# Patient Record
Sex: FEMALE | NOT HISPANIC OR LATINO | Employment: FULL TIME | URBAN - METROPOLITAN AREA
[De-identification: names, ages, dates, MRNs, and addresses within clinical notes are randomized per-mention and may not be internally consistent; named-entity substitution may affect disease eponyms.]

---

## 2017-01-19 ENCOUNTER — RESULT FOLLOW UP (OUTPATIENT)
Dept: FAMILY MEDICINE | Facility: CLINIC | Age: 42
End: 2017-01-19

## 2017-01-19 ENCOUNTER — OFFICE VISIT (OUTPATIENT)
Dept: FAMILY MEDICINE | Facility: CLINIC | Age: 42
End: 2017-01-19
Payer: COMMERCIAL

## 2017-01-19 VITALS
OXYGEN SATURATION: 99 % | HEART RATE: 67 BPM | BODY MASS INDEX: 30.73 KG/M2 | TEMPERATURE: 97.5 F | WEIGHT: 180 LBS | HEIGHT: 64 IN | DIASTOLIC BLOOD PRESSURE: 75 MMHG | SYSTOLIC BLOOD PRESSURE: 111 MMHG

## 2017-01-19 DIAGNOSIS — N76.0 BV (BACTERIAL VAGINOSIS): ICD-10-CM

## 2017-01-19 DIAGNOSIS — B96.89 BV (BACTERIAL VAGINOSIS): ICD-10-CM

## 2017-01-19 DIAGNOSIS — Z12.4 CERVICAL CANCER SCREENING: ICD-10-CM

## 2017-01-19 DIAGNOSIS — Z11.3 SCREEN FOR STD (SEXUALLY TRANSMITTED DISEASE): ICD-10-CM

## 2017-01-19 DIAGNOSIS — N89.8 VAGINAL DISCHARGE: ICD-10-CM

## 2017-01-19 DIAGNOSIS — R30.9 PAIN PASSING URINE: Primary | ICD-10-CM

## 2017-01-19 DIAGNOSIS — R87.810 CERVICAL HIGH RISK HPV (HUMAN PAPILLOMAVIRUS) TEST POSITIVE: Primary | ICD-10-CM

## 2017-01-19 LAB
ALBUMIN UR-MCNC: NEGATIVE MG/DL
APPEARANCE UR: CLEAR
BILIRUB UR QL STRIP: NEGATIVE
COLOR UR AUTO: YELLOW
GLUCOSE UR STRIP-MCNC: NEGATIVE MG/DL
HGB UR QL STRIP: NEGATIVE
KETONES UR STRIP-MCNC: NEGATIVE MG/DL
LEUKOCYTE ESTERASE UR QL STRIP: NEGATIVE
MICRO REPORT STATUS: ABNORMAL
NITRATE UR QL: NEGATIVE
PH UR STRIP: 6.5 PH (ref 5–7)
SP GR UR STRIP: 1.01 (ref 1–1.03)
SPECIMEN SOURCE: ABNORMAL
URN SPEC COLLECT METH UR: NORMAL
UROBILINOGEN UR STRIP-ACNC: 0.2 EU/DL (ref 0.2–1)
WET PREP SPEC: ABNORMAL

## 2017-01-19 PROCEDURE — 87389 HIV-1 AG W/HIV-1&-2 AB AG IA: CPT | Performed by: PHYSICIAN ASSISTANT

## 2017-01-19 PROCEDURE — 87624 HPV HI-RISK TYP POOLED RSLT: CPT | Performed by: PHYSICIAN ASSISTANT

## 2017-01-19 PROCEDURE — 86780 TREPONEMA PALLIDUM: CPT | Performed by: PHYSICIAN ASSISTANT

## 2017-01-19 PROCEDURE — 87210 SMEAR WET MOUNT SALINE/INK: CPT | Performed by: PHYSICIAN ASSISTANT

## 2017-01-19 PROCEDURE — 87591 N.GONORRHOEAE DNA AMP PROB: CPT | Performed by: PHYSICIAN ASSISTANT

## 2017-01-19 PROCEDURE — 36415 COLL VENOUS BLD VENIPUNCTURE: CPT | Performed by: PHYSICIAN ASSISTANT

## 2017-01-19 PROCEDURE — 99213 OFFICE O/P EST LOW 20 MIN: CPT | Performed by: PHYSICIAN ASSISTANT

## 2017-01-19 PROCEDURE — 86803 HEPATITIS C AB TEST: CPT | Performed by: PHYSICIAN ASSISTANT

## 2017-01-19 PROCEDURE — 81003 URINALYSIS AUTO W/O SCOPE: CPT | Performed by: PHYSICIAN ASSISTANT

## 2017-01-19 PROCEDURE — G0145 SCR C/V CYTO,THINLAYER,RESCR: HCPCS | Performed by: PHYSICIAN ASSISTANT

## 2017-01-19 PROCEDURE — 87491 CHLMYD TRACH DNA AMP PROBE: CPT | Performed by: PHYSICIAN ASSISTANT

## 2017-01-19 RX ORDER — METRONIDAZOLE 7.5 MG/G
1 GEL VAGINAL AT BEDTIME
Qty: 70 G | Refills: 0 | Status: SHIPPED | OUTPATIENT
Start: 2017-01-19 | End: 2017-01-24

## 2017-01-19 NOTE — LETTER
April 28, 2017      Mariola Pardo  [address] [facility] [address]    Dear ,      At [place], your health and wellness is our primary concern. That is why we are following up on a positive high risk HPV test from 1/19/17. Your provider had recommended that you have a Colposcopy completed by 4/19/17. Our records do not show that this has been done.      It is important to complete the follow up that your provider has suggested for you to ensure that there are no worsening changes which may, over time, develop into cancer.      If you have chosen not to do the recommended colposcopy, please contact our office at [phone] to schedule an appointment for a repeat PAP smear and HPV test at your earliest convenience.    If you have completed the tests outside of [facility] [place], please have the results forwarded to our office. We will update the chart for your primary Physician to review before your next annual physical.     Thank you for choosing [facility] [place]!    Sincerely,      Marita Jackson PA-C/maame

## 2017-01-19 NOTE — LETTER
[address] [id number] [phone]      February 13, 2017      Mariola Pardo  [address] [facility] [address]              Dear Mariola,    We have been unable to reach you by telephone regarding scheduling the Colposcopy.  Please contact our office to schedule this appointment at [phone].       Sincerely,      Marita Jackson PA-C

## 2017-01-19 NOTE — Clinical Note
[facility]  [address] [place]  [address]  [phone]        January 24, 2017    Oli Freitas  [address] [facility] [address]        Dear Oli,    Your STD testing is all NEGATIVE.      Results for orders placed or performed in visit on 01/19/17   UA reflex to Microscopic and Culture   Result Value Ref Range    Color Urine Yellow     Appearance Urine Clear     Glucose Urine Negative NEG mg/dL    Bilirubin Urine Negative NEG    Ketones Urine Negative NEG mg/dL    Specific Gravity Urine 1.015 1.003 - 1.035    Blood Urine Negative NEG    pH Urine 6.5 5.0 - 7.0 pH    Protein Albumin Urine Negative NEG mg/dL    Urobilinogen Urine 0.2 0.2 - 1.0 EU/dL    Nitrite Urine Negative NEG    Leukocyte Esterase Urine Negative NEG    Source Midstream Urine    Hepatitis C antibody   Result Value Ref Range    Hepatitis C Antibody  NR     Nonreactive   Assay performance characteristics have not been established for newborns,   infants, and children     HIV Antigen Antibody Combo   Result Value Ref Range    HIV Antigen Antibody Combo  NR     Nonreactive   HIV-1 p24 Ag & HIV-1/HIV-2 Ab Not Detected     Anti Treponema   Result Value Ref Range    Treponema pallidum Antibody Negative NEG   Pap imaged thin layer screen with HPV - recommended age 30 - 65 years (select HPV order below)   Result Value Ref Range    PAP NIL     Copath Report         Patient Name: OLI FREITAS  MR#: [id number]  Specimen #: [id number]  Collected: 1/19/2017  Received: 1/20/2017  Reported: 1/23/2017 14:45  Ordering Phy(s): KAVON CHUA    For improved result formatting, select 'View Enhanced Report Format'  under Linked Documents section.    SPECIMEN/STAIN PROCESS:  Pap imaged thin layer prep screening (Surepath, FocalPoint with guided  screening)       Pap-Cyto x 1, HPV ordered x 1    SOURCE: Cervical, endocervical  ----------------------------------------------------------------   Pap imaged thin layer  prep screening (Surepath, FocalPoint with guided  screening)  SPECIMEN ADEQUACY:  Satisfactory for evaluation.  -Transformation zone component absent.    CYTOLOGIC INTERPRETATION:    Negative for Intraepithelial Lesion or Malignancy         Organism(s):  -Shift in milka suggestive of bacterial vaginosis.    Electronically signed out by:  JESUS Carranza (ASCP)    Processed and screened at [facility]    CLINICAL HISTORY:  LMP: 1/6/2017    Papanicolaou Test Limitations:  Cervical cytology is a screening test  with limited sensitivity; regular screening is critical for cancer  prevention; Pap tests are primarily effective for the  diagnosis/prevention of squamous cell carcinoma, not adenocarcinomas or  other cancers.    TESTING LAB LOCATION:  [address]  [phone]    COLLECTION SITE:  Client:  [facility]  Location: [facility] (B)     Chlamydia trachomatis PCR   Result Value Ref Range    Specimen Description Cervix     Chlamydia Trachomatis PCR  NEG     Negative   Negative for C. trachomatis rRNA by transcription mediated amplification.   A negative result by transcription mediated amplification does not preclude the   presence of C. trachomatis infection because results are dependent on proper   and adequate collection, absence of inhibitors, and sufficient rRNA to be   detected.     Neisseria gonorrhoeae PCR   Result Value Ref Range    Specimen Descrip Cervix     N Gonorrhea PCR  NEG     Negative   Negative for N. gonorrhoeae rRNA by transcription mediated amplification.   A negative result by transcription mediated amplification does not preclude the   presence of N. gonorrhoeae infection because results are dependent on proper   and adequate collection, absence of inhibitors, and sufficient rRNA to be   detected.     Wet prep   Result Value Ref Range     Specimen Description Vagina     Wet Prep (A)      No Trichomonas seen  Clue cells seen  No yeast seen      Micro Report Status FINAL 01/19/2017        If you have any questions please call the clinic at [phone].    Sincerely,    Marita DOOLEY

## 2017-01-19 NOTE — PROGRESS NOTES
SUBJECTIVE:                                                    Mariola Pardo is a 41 year old female who presents to clinic today for the following health issues:      URINARY TRACT SYMPTOMS     Onset: 2 days      Description:   Painful urination (Dysuria): YES--burns after peeing  Blood in urine (Hematuria): no   Delay in urine (Hesitency): no    Intensity: moderate    Progression of Symptoms:  worsening    Accompanying Signs & Symptoms:  Fever/chills: no   Flank pain no  Nausea and vomiting: YES--nausea  Any vaginal symptoms: yellowish vaginal discharge and vaginal odor  Abdominal/Pelvic Pain: YES   History:   History of frequent UTI's: no   History of kidney stones: no   Sexually Active: no   Possibility of pregnancy: No    Precipitating factors:   none         Therapies Tried and outcome: nothing tried     2 days ago started burning after peeing. Urine is dark yellow. Feels she is drinking enough water.     Unprotected [place] in November with a man with multiple partners. Would like STD testing. No new partners since November. LMP 1/6/17.    Pain in belly button that started 2 day ago. Hurts if presses belly button. Radiates down to left thigh.     She also has cramping in left ovary that began with her period on 1/6/17 and has remained. It comes and goes. Feels like a tightening and aches. Period this month was darker than normal but not heavier than normal. Some new bloating this month. No constipation.    Had an abnormal pap smear at last visit at the [facility]. They took a sample of cells and it was reassuring.     Vaginal discharge is yellow, and cottage cheese like with a foul order like formaldehyde.    Had a cold with productive cough since the 8th. Runny nose, congestion. Yellowish phelgm in morning. Has some nausea. No headaches, diarrhea or vomiting. Still smoking.    Family history of hysterectomies. One from an infection. Grandma's sister had cervical cancer.  "    Problem list and histories reviewed & adjusted, as indicated.  Additional history: as documented    Problem list, Medication list, Allergies, and Medical/Social/Surgical histories reviewed in EPIC and updated as appropriate.    ROS:  Constitutional, HEENT, cardiovascular, pulmonary, gi and gu systems are negative, except as otherwise noted.    OBJECTIVE:                                                    /75 mmHg  Pulse 67  Temp(Src) 97.5  F (36.4  C) (Oral)  Ht 5' 4\" (1.626 m)  Wt 180 lb (81.647 kg)  BMI 30.88 kg/m2  SpO2 99%  LMP 01/06/2017  Body mass index is 30.88 kg/(m^2).  GENERAL: healthy, alert and no distress  ABDOMEN: Tenderness around umbilicus. No guarding or rebound tenderness. No palpable masses.   (female): normal female external genitalia, normal urethral meatus, vaginal mucosa, normal cervix/adnexa/uterus without masses. Malodorous vaginal discharge present.  BACK: Tender along sacral region. no CVA tenderness.     Diagnostic Test Results:  Results for orders placed or performed in visit on 01/19/17   UA reflex to Microscopic and Culture   Result Value Ref Range    Color Urine Yellow     Appearance Urine Clear     Glucose Urine Negative NEG mg/dL    Bilirubin Urine Negative NEG    Ketones Urine Negative NEG mg/dL    Specific Gravity Urine 1.015 1.003 - 1.035    Blood Urine Negative NEG    pH Urine 6.5 5.0 - 7.0 pH    Protein Albumin Urine Negative NEG mg/dL    Urobilinogen Urine 0.2 0.2 - 1.0 EU/dL    Nitrite Urine Negative NEG    Leukocyte Esterase Urine Negative NEG    Source Midstream Urine    Wet prep   Result Value Ref Range    Specimen Description Vagina     Wet Prep (A)      No Trichomonas seen  Clue cells seen  No yeast seen      Micro Report Status FINAL 01/19/2017           ASSESSMENT/PLAN:                                                    1. Pain passing urine  Most likely due to bacterial vaginosis.  - UA reflex to Microscopic and Culture: see above    2. Screen for " STD (sexually transmitted disease)  Per patient's request, as she had unprotected sex in November with someone who had multiple partners.   - Chlamydia trachomatis PCR  - Neisseria gonorrhoeae PCR  - Hepatitis C antibody  - HIV Antigen Antibody Combo  - Anti Treponema  - Wet prep for Trichomonas    3. Cervical cancer screening  Patient has past history of abnormal cervical cancer screen and was due for her next screen.  - HPV High Risk Types DNA Cervical  - Pap imaged thin layer screen with HPV - recommended age 30 - 65 years (select HPV order below)    4. Vaginal discharge  Wet prep showed bacterial vaginosis.    5. BV (bacterial vaginosis)  Confirmed with wet prep. Discussed necessity of abstaining from alcohol with oral metronidazole. Patient prefers topical over oral for this reason.  - metroNIDAZOLE (METROGEL) 0.75 % vaginal gel; Place 1 applicator vaginally At Bedtime for 5 days  Dispense: 70 g; Refill: 0    Return to clinic when healthy for a physical and to work up the low back pain and left sided pelvic pain.    JOELLEN Castro-student  Marita Jackson PA-C  [facility]  The student Lata Decker PAS2 acted as a scribe and the encounter documented above was completely performed by myself and the documentation reflects the work I have performed today.   Marita Jackson PA-C

## 2017-01-19 NOTE — MR AVS SNAPSHOT
"              After Visit Summary   1/19/2017    Mariola Pardo    MRN: [id number]           Patient Information     Date Of Birth          1975        Visit Information        Provider Department      1/19/2017 2:20 PM Marita Jackson PA-C [facility]        Today's Diagnoses     Pain passing urine    -  1     Screen for STD (sexually transmitted disease)         Cervical cancer screening         Vaginal discharge         BV (bacterial vaginosis)           Care Instructions    Return to clinic for a physical.         Follow-ups after your visit        Who to contact     If you have questions or need follow up information about today's clinic visit or your schedule please contact [facility] directly at [phone].  Normal or non-critical lab and imaging results will be communicated to you by MyChart, letter or phone within 4 business days after the clinic has received the results. If you do not hear from us within 7 days, please contact the clinic through MyChart or phone. If you have a critical or abnormal lab result, we will notify you by phone as soon as possible.  Submit refill requests through [employer] or call your pharmacy and they will forward the refill request to us. Please allow 3 business days for your refill to be completed.          Additional Information About Your Visit        MyChart Information     [employer] lets you send messages to your doctor, view your test results, renew your prescriptions, schedule appointments and more. To sign up, go to [email or web address] . Click on \"Log in\" on the left side of the screen, which will take you to the Welcome page. Then click on \"Sign up Now\" on the right side of the page.     You will be asked to enter the access code listed below, as well as some personal information. Please follow the directions to create your username and password.     Your access code is: [email or web address]  Expires: 4/19/2017  2:53 PM   " "  Your access code will  in 90 days. If you need help or a new code, please call your [facility] clinic or [phone].        Care EveryWhere ID     This is your Care EveryWhere ID. This could be used by other organizations to access your [facility] medical records  [id number]        Your Vitals Were     Pulse Temperature Height BMI (Body Mass Index) Pulse Oximetry Last Period    67 97.5  F (36.4  C) (Oral) 5' 4\" (1.626 m) 30.88 kg/m2 99% 2017       Blood Pressure from Last 3 Encounters:   17 111/75    Weight from Last 3 Encounters:   17 180 lb (81.647 kg)              We Performed the Following     Anti Treponema     Chlamydia trachomatis PCR     Hepatitis C antibody     HIV Antigen Antibody Combo     HPV High Risk Types DNA Cervical     Neisseria gonorrhoeae PCR     Pap imaged thin layer screen with HPV - recommended age 30 - 65 years (select HPV order below)     UA reflex to Microscopic and Culture     Wet prep          Today's Medication Changes          These changes are accurate as of: 17  2:53 PM.  If you have any questions, ask your nurse or doctor.               Start taking these medicines.        Dose/Directions    metroNIDAZOLE 0.75 % vaginal gel   Commonly known as:  METROGEL   Used for:  BV (bacterial vaginosis)   Started by:  Marita Jackson PA-C        Dose:  1 applicator   Place 1 applicator vaginally At Bedtime for 5 days   Quantity:  70 g   Refills:  0            Where to get your medicines      These medications were sent to [facility] [place] - [place], [place] - [address]  [address], [place]. \[facility]"" [id number]     Phone:  [phone]    - metroNIDAZOLE 0.75 % vaginal gel             Primary Care Provider    None Specified       No primary provider on file.        Thank you!     Thank you for choosing [facility]  for your care. Our goal is always to provide you with excellent care. Hearing back from our " patients is one way we can continue to improve our services. Please take a few minutes to complete the written survey that you may receive in the mail after your visit with us. Thank you!             Your Updated Medication List - Protect others around you: Learn how to safely use, store and throw away your medicines at www.disposemymeds.org.          This list is accurate as of: 1/19/17  2:53 PM.  Always use your most recent med list.                   Brand Name Dispense Instructions for use    metroNIDAZOLE 0.75 % vaginal gel    METROGEL    70 g    Place 1 applicator vaginally At Bedtime for 5 days       PRENATAL AD PO

## 2017-01-19 NOTE — LETTER
March 30, 2017      Mariola Pardo  [address] [facility] [address]    Dear ,    At [place], your health and wellness is our primary concern. That is why we are following up on an abnormal pap from 1/19/17, which was reported as positive for HPV. Your provider had recommended that you have a Colposcopy completed by 4/19/17. Our records do not show that this has not been scheduled.       It is important to complete the follow up that your provider has suggested for you to ensure that there are no worsening changes which may, over time, develop into cancer.      Please contact our office at  [phone] to schedule an appointment for a Colposcopy at your earliest convenience. If you have questions or concerns, please call the clinic and we will be happy to assist you.    If you have completed the tests outside of [place], please have the results forwarded to our office. We will update the chart for your primary Physician to review before your next annual physical.     Thank you for choosing [place]!    Sincerely,      Marita Jackson PA-C/tracy

## 2017-01-19 NOTE — NURSING NOTE
"Chief Complaint   Patient presents with     UTI       Initial /75 mmHg  Pulse 67  Temp(Src) 97.5  F (36.4  C) (Oral)  Ht 5' 4\" (1.626 m)  Wt 180 lb (81.647 kg)  BMI 30.88 kg/m2  SpO2 99%  LMP 01/06/2017 Estimated body mass index is 30.88 kg/(m^2) as calculated from the following:    Height as of this encounter: 5' 4\" (1.626 m).    Weight as of this encounter: 180 lb (81.647 kg).  BP completed using cuff size: cynthia Song CMA      "

## 2017-01-20 LAB
C TRACH DNA SPEC QL NAA+PROBE: NORMAL
HCV AB SERPL QL IA: NORMAL
HIV 1+2 AB+HIV1 P24 AG SERPL QL IA: NORMAL
N GONORRHOEA DNA SPEC QL NAA+PROBE: NORMAL
SPECIMEN SOURCE: NORMAL
SPECIMEN SOURCE: NORMAL
T PALLIDUM IGG+IGM SER QL: NEGATIVE

## 2017-01-23 LAB
COPATH REPORT: NORMAL
PAP: NORMAL

## 2017-01-25 PROBLEM — R87.810 CERVICAL HIGH RISK HPV (HUMAN PAPILLOMAVIRUS) TEST POSITIVE: Status: ACTIVE | Noted: 2017-01-19

## 2017-01-25 LAB
FINAL DIAGNOSIS: ABNORMAL
HPV HR 12 DNA CVX QL NAA+PROBE: POSITIVE
HPV16 DNA SPEC QL NAA+PROBE: NEGATIVE
HPV18 DNA SPEC QL NAA+PROBE: NEGATIVE
SPECIMEN DESCRIPTION: ABNORMAL

## 2017-01-25 NOTE — PROGRESS NOTES
7/2/14 LSIL pap/+ HR HPV (not 16 or 18).in Care Everywhere.  2016 Patient reported, abnormal pap at the [facility]. They took a sample of cells and it was reassuring.  1/19/17 NIL pap/+ HR HPV (not 16 or 18). Plan: colp bef 4/19/17 2/2/17 Pt. Advised.  2/6/17 LM  2/8/17 LM   2/13/17 Letter sent.   3/30/17 No colp scheduled, 2 mo reminder letter sent  4/19/17 [place] not done, updated to 6mo dx pap.  Letter Sent 4/28/17 (rlm)  12/06/17 LMTC and schedule at [facility]. ([facility])  12/20/17 Patient is lost to follow-up. Routed to provider for review. Provider acknowledged. ([facility])

## 2017-12-06 ENCOUNTER — TELEPHONE (OUTPATIENT)
Dept: FAMILY MEDICINE | Facility: CLINIC | Age: 42
End: 2017-12-06

## 2017-12-06 NOTE — TELEPHONE ENCOUNTER
Pt is past due for f/u pap smear if declining recommended colposcopy.  Reminder letter was sent 04/28/17.  LMTC and schedule at [facility].  Left this writer's number in case of questions ([phone]).  If no reply and/or appt within 2 weeks (12/20/17) pt will be considered lost to pap tracking f/u.  Susana So,    Pap Tracking

## 2019-04-09 ENCOUNTER — OFFICE VISIT (OUTPATIENT)
Dept: FAMILY MEDICINE | Facility: CLINIC | Age: 44
End: 2019-04-09
Payer: COMMERCIAL

## 2019-04-09 VITALS
DIASTOLIC BLOOD PRESSURE: 77 MMHG | HEART RATE: 66 BPM | TEMPERATURE: 98.2 F | WEIGHT: 165 LBS | OXYGEN SATURATION: 99 % | HEIGHT: 64 IN | BODY MASS INDEX: 28.17 KG/M2 | SYSTOLIC BLOOD PRESSURE: 124 MMHG

## 2019-04-09 DIAGNOSIS — Z83.3 FAMILY HISTORY OF DIABETES MELLITUS: ICD-10-CM

## 2019-04-09 DIAGNOSIS — N90.89 LABIAL LESION: ICD-10-CM

## 2019-04-09 DIAGNOSIS — R87.810 CERVICAL HIGH RISK HPV (HUMAN PAPILLOMAVIRUS) TEST POSITIVE: ICD-10-CM

## 2019-04-09 DIAGNOSIS — Z11.3 SCREEN FOR STD (SEXUALLY TRANSMITTED DISEASE): ICD-10-CM

## 2019-04-09 DIAGNOSIS — N76.0 BACTERIAL VAGINOSIS: ICD-10-CM

## 2019-04-09 DIAGNOSIS — N89.8 VAGINAL IRRITATION: ICD-10-CM

## 2019-04-09 DIAGNOSIS — Z72.51 HIGH RISK HETEROSEXUAL BEHAVIOR: ICD-10-CM

## 2019-04-09 DIAGNOSIS — B96.89 BACTERIAL VAGINOSIS: ICD-10-CM

## 2019-04-09 DIAGNOSIS — Z00.01 ENCOUNTER FOR PREVENTATIVE ADULT HEALTH CARE EXAM WITH ABNORMAL FINDINGS: Primary | ICD-10-CM

## 2019-04-09 LAB
ANION GAP SERPL CALCULATED.3IONS-SCNC: 9 MMOL/L (ref 3–14)
BUN SERPL-MCNC: 10 MG/DL (ref 7–30)
CALCIUM SERPL-MCNC: 8.5 MG/DL (ref 8.5–10.1)
CHLORIDE SERPL-SCNC: 105 MMOL/L (ref 94–109)
CO2 SERPL-SCNC: 23 MMOL/L (ref 20–32)
CREAT SERPL-MCNC: 0.51 MG/DL (ref 0.52–1.04)
GFR SERPL CREATININE-BSD FRML MDRD: >90 ML/MIN/{1.73_M2}
GLUCOSE SERPL-MCNC: 87 MG/DL (ref 70–99)
HBA1C MFR BLD: 5.5 % (ref 0–5.6)
POTASSIUM SERPL-SCNC: 3.8 MMOL/L (ref 3.4–5.3)
SODIUM SERPL-SCNC: 137 MMOL/L (ref 133–144)
SPECIMEN SOURCE: NORMAL
TSH SERPL DL<=0.005 MIU/L-ACNC: 1.55 MU/L (ref 0.4–4)
WET PREP SPEC: NORMAL

## 2019-04-09 PROCEDURE — 87210 SMEAR WET MOUNT SALINE/INK: CPT | Performed by: NURSE PRACTITIONER

## 2019-04-09 PROCEDURE — 87252 VIRUS INOCULATION TISSUE: CPT | Mod: 90 | Performed by: NURSE PRACTITIONER

## 2019-04-09 PROCEDURE — 99396 PREV VISIT EST AGE 40-64: CPT | Performed by: NURSE PRACTITIONER

## 2019-04-09 PROCEDURE — 83036 HEMOGLOBIN GLYCOSYLATED A1C: CPT | Performed by: NURSE PRACTITIONER

## 2019-04-09 PROCEDURE — 80048 BASIC METABOLIC PNL TOTAL CA: CPT | Performed by: NURSE PRACTITIONER

## 2019-04-09 PROCEDURE — 0064U ANTB TP TOTAL&RPR IA QUAL: CPT | Performed by: NURSE PRACTITIONER

## 2019-04-09 PROCEDURE — 36415 COLL VENOUS BLD VENIPUNCTURE: CPT | Performed by: NURSE PRACTITIONER

## 2019-04-09 PROCEDURE — 99000 SPECIMEN HANDLING OFFICE-LAB: CPT | Performed by: NURSE PRACTITIONER

## 2019-04-09 PROCEDURE — 86803 HEPATITIS C AB TEST: CPT | Performed by: NURSE PRACTITIONER

## 2019-04-09 PROCEDURE — 87591 N.GONORRHOEAE DNA AMP PROB: CPT | Performed by: NURSE PRACTITIONER

## 2019-04-09 PROCEDURE — 84443 ASSAY THYROID STIM HORMONE: CPT | Performed by: NURSE PRACTITIONER

## 2019-04-09 PROCEDURE — 99214 OFFICE O/P EST MOD 30 MIN: CPT | Mod: 25 | Performed by: NURSE PRACTITIONER

## 2019-04-09 PROCEDURE — 87389 HIV-1 AG W/HIV-1&-2 AB AG IA: CPT | Performed by: NURSE PRACTITIONER

## 2019-04-09 PROCEDURE — 87491 CHLMYD TRACH DNA AMP PROBE: CPT | Performed by: NURSE PRACTITIONER

## 2019-04-09 RX ORDER — METRONIDAZOLE 7.5 MG/G
1 GEL VAGINAL DAILY
Qty: 70 G | Refills: 0 | Status: SHIPPED | OUTPATIENT
Start: 2019-04-09 | End: 2019-04-14

## 2019-04-09 ASSESSMENT — ENCOUNTER SYMPTOMS
EYE PAIN: 0
NAUSEA: 0
NERVOUS/ANXIOUS: 0
HEMATURIA: 0
BREAST MASS: 0
SHORTNESS OF BREATH: 0
ABDOMINAL PAIN: 1
DIZZINESS: 1
SORE THROAT: 0
DYSURIA: 0
HEADACHES: 0
WEAKNESS: 0
FEVER: 0
JOINT SWELLING: 0
HEMATOCHEZIA: 0
HEARTBURN: 0
COUGH: 0
CONSTIPATION: 0
CHILLS: 0
DIARRHEA: 0
PARESTHESIAS: 0
PALPITATIONS: 0
FREQUENCY: 1
ARTHRALGIAS: 1
MYALGIAS: 1

## 2019-04-09 ASSESSMENT — PAIN SCALES - GENERAL: PAINLEVEL: NO PAIN (0)

## 2019-04-09 ASSESSMENT — MIFFLIN-ST. JEOR: SCORE: 1398.44

## 2019-04-09 NOTE — PATIENT INSTRUCTIONS
You are due for your Tdap vaccine but you believe you had this done within the past 10 years. Possibly at [facility]. Check your records and let us know.       Preventive Health Recommendations  Female Ages 40 to 49    Yearly exam:     See your health care provider every year in order to  1. Review health changes.   2. Discuss preventive care.    3. Review your medicines if your doctor prescribed any.      Get a Pap test every three years (unless you have an abnormal result and your provider advises testing more often).      If you get Pap tests with HPV test, you only need to test every 5 years, unless you have an abnormal result. You do not need a Pap test if your uterus was removed (hysterectomy) and you have not had cancer.      You should be tested each year for STDs (sexually transmitted diseases), if you're at risk.     Ask your doctor if you should have a mammogram.      Have a colonoscopy (test for colon cancer) if someone in your family has had colon cancer or polyps before age 50.       Have a cholesterol test every 5 years.       Have a diabetes test (fasting glucose) after age 45. If you are at risk for diabetes, you should have this test every 3 years.    Shots: Get a flu shot each year. Get a tetanus shot every 10 years.     Nutrition:     Eat at least 5 servings of fruits and vegetables each day.    Eat whole-grain bread, whole-wheat pasta and brown rice instead of white grains and rice.    Get adequate Calcium and Vitamin D.      Lifestyle    Exercise at least 150 minutes a week (an average of 30 minutes a day, 5 days a week). This will help you control your weight and prevent disease.    Limit alcohol to one drink per day.    No smoking.     Wear sunscreen to prevent skin cancer.    See your dentist every six months for an exam and cleaning.
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
## 2019-04-10 LAB
C TRACH DNA SPEC QL NAA+PROBE: NEGATIVE
HCV AB SERPL QL IA: NONREACTIVE
HIV 1+2 AB+HIV1 P24 AG SERPL QL IA: NONREACTIVE
N GONORRHOEA DNA SPEC QL NAA+PROBE: NEGATIVE
SPECIMEN SOURCE: NORMAL
SPECIMEN SOURCE: NORMAL
T PALLIDUM AB SER QL: NONREACTIVE

## 2019-04-15 NOTE — RESULT ENCOUNTER NOTE
Mariola Pardo,    Your lab results have been released to [facility].   Thyroid function and basic metabolic panel (looks at kidney function and electrolytes) are within normal ranges.   STD screening was negative.   Your viral culture is still in process though I think the genital lesion you had is unlikely to be herpes. I will let you know the results when I get them.   Please call the clinic if you have any concerns [phone].    CHARO Loo [facility]

## 2019-04-19 ENCOUNTER — HEALTH MAINTENANCE LETTER (OUTPATIENT)
Age: 44
End: 2019-04-19

## 2019-04-23 LAB
SPECIMEN SOURCE: NORMAL
VIRUS SPEC CULT: NORMAL
VIRUS SPEC CULT: NORMAL

## 2019-05-02 ENCOUNTER — RESULT FOLLOW UP (OUTPATIENT)
Dept: OBGYN | Facility: CLINIC | Age: 44
End: 2019-05-02

## 2019-05-02 ENCOUNTER — OFFICE VISIT (OUTPATIENT)
Dept: OBGYN | Facility: CLINIC | Age: 44
End: 2019-05-02
Payer: COMMERCIAL

## 2019-05-02 VITALS
SYSTOLIC BLOOD PRESSURE: 110 MMHG | WEIGHT: 167 LBS | HEART RATE: 70 BPM | DIASTOLIC BLOOD PRESSURE: 63 MMHG | BODY MASS INDEX: 28.67 KG/M2 | TEMPERATURE: 98.6 F

## 2019-05-02 DIAGNOSIS — R87.810 CERVICAL HIGH RISK HPV (HUMAN PAPILLOMAVIRUS) TEST POSITIVE: Primary | ICD-10-CM

## 2019-05-02 DIAGNOSIS — Z12.4 SCREENING FOR MALIGNANT NEOPLASM OF CERVIX: ICD-10-CM

## 2019-05-02 DIAGNOSIS — R87.810 ASCUS WITH POSITIVE HIGH RISK HPV CERVICAL: ICD-10-CM

## 2019-05-02 DIAGNOSIS — R87.610 ASCUS WITH POSITIVE HIGH RISK HPV CERVICAL: ICD-10-CM

## 2019-05-02 LAB — HCG UR QL: NEGATIVE

## 2019-05-02 PROCEDURE — 88175 CYTOPATH C/V AUTO FLUID REDO: CPT | Performed by: OBSTETRICS & GYNECOLOGY

## 2019-05-02 PROCEDURE — 99202 OFFICE O/P NEW SF 15 MIN: CPT | Performed by: OBSTETRICS & GYNECOLOGY

## 2019-05-02 PROCEDURE — G0124 SCREEN C/V THIN LAYER BY MD: HCPCS | Performed by: OBSTETRICS & GYNECOLOGY

## 2019-05-02 PROCEDURE — 87624 HPV HI-RISK TYP POOLED RSLT: CPT | Performed by: OBSTETRICS & GYNECOLOGY

## 2019-05-02 PROCEDURE — 81025 URINE PREGNANCY TEST: CPT | Performed by: OBSTETRICS & GYNECOLOGY

## 2019-05-02 NOTE — LETTER
May 29, 2019    Mariola Pardo  [address] [facility] [address]    Dear ,      We are contacting you in writing because we have been unable to reach you by phone.     We have recently received your PAP smear result. The result shows ASCUS or Atypical Squamous Cells of Undetermined Significance. This indicates a mild change only    We also tested your sample for the presence of the HPV (Human Papillomavirus). Your sample was positive for HPV. There are many types of HPV, but we test pap samples for the high risk types. HPV can be the cause of an abnormal Pap smear result.  The high risk types of HPV can also be related to the potential development of cervical cancer if not monitored and/or treated appropriately    Because of this, we need to do further testing. It is recommended that you schedule a colposcopy. Colposcopy is a way for your doctor to use a special magnifying device to look at your vulva, vagina, and cervix. If a problem is seen during colposcopy, a small sample of tissue may be collected for laboratory testing (biopsy). The exam and test will help determine the reason for your abnormal pap smear.    Please call [facility] [phone] to schedule this procedure, due before 08/2/19. Schedule this for a time when you are not due to have a period (if having regular menstrual bleeding). You can take an over the counter pain reliever (either 600mg of Ibuprofen or 2 tablets Acetaminophen by mouth) 1 hour before your colposcopy. Nothing in the vagina for 24 hours before your colposcopy (no sex, douches, vaginal medications or lubricants). No unprotected intercourse 2 weeks prior to this procedure. They will also check a urine pregnancy test prior to the Colposcopy.     If you have additional questions regarding this result, please call our registered nurse, Renu at [phone].    Sincerely,  Sandra Maravilla MD./  Renu Wooten RN-Pap Tracking

## 2019-05-02 NOTE — LETTER
October 18, 2019      Mariolaasael Mora Char  [address] [place] [address]    Dear MsDigna,      At [facility], your health and wellness is our primary concern. That is why we are following up on an abnormal pap from 05/02/19, which was reported as ASCUS and positive for high risk HPV. Your provider had recommended that you have a Colposcopy  completed by 08/02/19. Our records do not show that this has been done or scheduled.      It is important to complete the follow up that your provider has suggested for you to ensure that there are no worsening changes which may, over time, develop into cancer.      If you have chosen not to do the recommended colposcopy, please contact our office at [phone] to schedule an appointment for a repeat PAP smear and HPV test at your earliest convenience.    If you have completed the tests outside of [facility], please have the results forwarded to our office. We will update the chart for your primary Physician to review before your next annual physical.     Thank you for choosing [facility]!    Sincerely,      Your [facility] Care Team//[facility]

## 2019-05-02 NOTE — PROGRESS NOTES
GYN CLINIC VISIT  2019  CC: cervical cancer screening    HPI:  Mariola Pardo is a 43 year old  who presents for cervical cancer screening. She was lost to follow up and presents today with the following history:  14 LSIL pap/+ HR HPV (not 16 or 18).in Care Everywhere.   Patient reported, abnormal pap at the [facility]. They took a sample of cells and it was reassuring.  17 NIL pap/+ HR HPV (not 16 or 18). Plan: colp bef 17 Pt. Advised.  17 Patient is lost to follow-up.    No LMP recorded.  UPT today is negative  Patient does not smoke  Type of contraception: tubal ligation  Age at first sexual intercourse: 30  Number of sexual partners (lifetime): 16  Past GYN history: HPV  Prior cervical/vaginal disease: Normal exam without visible pathology.  Prior cervical treatment: no treatment.    Vitals:    19 1302   BP: 110/63   Pulse: 70   Temp: 98.6  F (37  C)   TempSrc: Oral   Weight: 75.8 kg (167 lb)     Gen: alert, oriented, no distress,  pleasant, appears stated age  HEENT: head normocephalic, atraumatic  CV: normal pulses, no edema  Resp: normal respiratory effort  Abd: soft, nontender, nondistended  : normal external genitalia without lesions or erythema; normal, well supported urethra, normal Bartholins, normal Skenes; normal pink rugated vaginal mucosa, no lesions or abnormal discharge; long rajwinder speculum inserted without difficulty; normal appearing cervix without lesions, bleeding or discharge.   Extr: warm, well perfused, nontender  Psych: affect bright, cooperative, responds appropriately      ASSESSMENT:  43 year old here for cervical cancer screening.    Discussed that I recommend pap and HPV test today. If both are normal, recommend cotest in 1 year. If either are abnormal, recommend colposcopy. Discussed this recommendation with patient who is in agreement. She would like to avoid colposcopy if not indicated but understands the  possibility that she may need to return for a colposcopy.    1. Cervical high risk HPV (human papillomavirus) test positive  - HCG Qual, Urine ([id number])    2. Screening for malignant neoplasm of cervix  - Pap imaged thin layer screen with HPV - recommended age 30 - 65 years (select HPV order below)  - HPV High Risk Types DNA Cervical      Sandra Maravilla MD

## 2019-05-02 NOTE — LETTER
[address]  [facility] [address]  [phone]      June 17, 2019    Mariola Pardo                                                                                                                     [address] [facility] [address]      Dear ,    We are contacting you in writing by certified letter because we have been unable to reach you by phone and by letter and want to ensure that you have recieved these results and recommendations.     We have recently received your PAP smear result. The result shows ASCUS or Atypical Squamous Cells of Undetermined Significance. This indicates a mild change only    We also tested your sample for the presence of the HPV (Human Papillomavirus). Your sample was positive for HPV. There are many types of HPV, but we test pap samples for the high risk types. HPV can be the cause of an abnormal Pap smear result.  The high risk types of HPV can also be related to the potential development of cervical cancer if not monitored and/or treated appropriately    Because of this, we need to do further testing. It is recommended that you schedule a colposcopy. Colposcopy is a way for your doctor to use a special magnifying device to look at your vulva, vagina, and cervix. If a problem is seen during colposcopy, a small sample of tissue may be collected for laboratory testing (biopsy). The exam and test will help determine the reason for your abnormal pap smear.    Please call [facility] [phone] to schedule this procedure, due before 08/2/19. Schedule this for a time when you are not due to have a period (if having regular menstrual bleeding). You can take an over the counter pain reliever (either 600mg of Ibuprofen or 2 tablets Acetaminophen by mouth) 1 hour before your colposcopy. Nothing in the vagina for 24 hours before your colposcopy (no sex, douches, vaginal medications or lubricants). No unprotected  intercourse 2 weeks prior to this procedure. They will also check a urine pregnancy test prior to the Colposcopy.     If you have additional questions regarding this result, please call our registered nurse, Renu at [phone].    Sincerely,    Sandra Maravilla MD./  Renu Wooten RN-Pap Tracking

## 2019-05-07 LAB
COPATH REPORT: ABNORMAL
PAP: ABNORMAL

## 2019-05-08 PROBLEM — R87.610 ASCUS WITH POSITIVE HIGH RISK HPV CERVICAL: Status: ACTIVE | Noted: 2017-01-19

## 2019-05-08 LAB
FINAL DIAGNOSIS: ABNORMAL
HPV HR 12 DNA CVX QL NAA+PROBE: POSITIVE
HPV16 DNA SPEC QL NAA+PROBE: NEGATIVE
HPV18 DNA SPEC QL NAA+PROBE: NEGATIVE
SPECIMEN DESCRIPTION: ABNORMAL
SPECIMEN SOURCE CVX/VAG CYTO: ABNORMAL

## 2019-05-08 NOTE — PROGRESS NOTES
7/2/14 LSIL pap/+ HR HPV (not 16 or 18).in Care Everywhere.  2016 Patient reported, abnormal pap at the [facility]. They took a sample of cells and it was reassuring.  1/19/17 NIL pap/+ HR HPV (not 16 or 18). Plan: colp bef 4/19/17 2/2/17 Pt. Advised.  12/20/17 Patient is lost to follow-up.  05/2/19: ASCUS Pap, + HR HPV (not 16 or 18) result. Plan [facility].   5/9/19 Message left to return call. ()  05/14/19: Msg left to call back.   05/21/19: Tc to mail saved result and recommendation letter. I also released this to the pt via [facility].   05/29/19 Result letter sent at request of RN. ([facility])  06/17/19: Result letter sent certified at request of RN: [phone] ([facility])  07/12/19 Per USPS tracking, certified letter returned to sender 07/11/19. Verification sent to [facility] for scanning into pts chart. ([facility])  08/05/19 3mo colp not done, chart and tracking updated for 6mo colp/pap. ([facility])  10/18/19 [facility]/pap reminder letter sent. ([facility])  11/15/19 [facility] clinic and schedule. ([facility])  12/12/19 Patient is lost to pap tracking follow-up. FYI routed to provider. ([facility])

## 2019-11-02 ENCOUNTER — HEALTH MAINTENANCE LETTER (OUTPATIENT)
Age: 44
End: 2019-11-02

## 2019-11-15 ENCOUNTER — TELEPHONE (OUTPATIENT)
Dept: OBGYN | Facility: CLINIC | Age: 44
End: 2019-11-15

## 2019-11-15 DIAGNOSIS — R87.810 ASCUS WITH POSITIVE HIGH RISK HPV CERVICAL: ICD-10-CM

## 2019-11-15 DIAGNOSIS — R87.610 ASCUS WITH POSITIVE HIGH RISK HPV CERVICAL: ICD-10-CM

## 2019-11-15 NOTE — TELEPHONE ENCOUNTER
Pt is past due for f/u pap smear if declining recommended colposcopy.  [facility] clinic and schedule.    Susana So  Pap Tracking

## 2020-02-10 ENCOUNTER — HEALTH MAINTENANCE LETTER (OUTPATIENT)
Age: 45
End: 2020-02-10

## 2020-11-14 ENCOUNTER — HEALTH MAINTENANCE LETTER (OUTPATIENT)
Age: 45
End: 2020-11-14

## 2021-09-10 ENCOUNTER — OFFICE VISIT (OUTPATIENT)
Dept: URGENT CARE | Facility: URGENT CARE | Age: 46
End: 2021-09-10
Payer: COMMERCIAL

## 2021-09-10 VITALS
BODY MASS INDEX: 32.61 KG/M2 | TEMPERATURE: 98.3 F | HEART RATE: 89 BPM | DIASTOLIC BLOOD PRESSURE: 81 MMHG | OXYGEN SATURATION: 99 % | WEIGHT: 190 LBS | SYSTOLIC BLOOD PRESSURE: 127 MMHG

## 2021-09-10 DIAGNOSIS — R30.0 DYSURIA: ICD-10-CM

## 2021-09-10 DIAGNOSIS — N30.00 ACUTE CYSTITIS WITHOUT HEMATURIA: Primary | ICD-10-CM

## 2021-09-10 LAB
ALBUMIN UR-MCNC: NEGATIVE MG/DL
APPEARANCE UR: CLEAR
BACTERIA #/AREA URNS HPF: ABNORMAL /HPF
BILIRUB UR QL STRIP: NEGATIVE
COLOR UR AUTO: YELLOW
GLUCOSE UR STRIP-MCNC: NEGATIVE MG/DL
HGB UR QL STRIP: NEGATIVE
KETONES UR STRIP-MCNC: NEGATIVE MG/DL
LEUKOCYTE ESTERASE UR QL STRIP: NEGATIVE
NITRATE UR QL: POSITIVE
PH UR STRIP: 6.5 [PH] (ref 5–7)
RBC #/AREA URNS AUTO: ABNORMAL /HPF
SP GR UR STRIP: 1.01 (ref 1–1.03)
SQUAMOUS #/AREA URNS AUTO: ABNORMAL /LPF
UROBILINOGEN UR STRIP-ACNC: 0.2 E.U./DL
WBC #/AREA URNS AUTO: ABNORMAL /HPF

## 2021-09-10 PROCEDURE — 99213 OFFICE O/P EST LOW 20 MIN: CPT | Performed by: FAMILY MEDICINE

## 2021-09-10 PROCEDURE — 81001 URINALYSIS AUTO W/SCOPE: CPT

## 2021-09-10 PROCEDURE — 87086 URINE CULTURE/COLONY COUNT: CPT | Performed by: FAMILY MEDICINE

## 2021-09-10 RX ORDER — NITROFURANTOIN 25; 75 MG/1; MG/1
100 CAPSULE ORAL 2 TIMES DAILY
Qty: 10 CAPSULE | Refills: 0 | Status: SHIPPED | OUTPATIENT
Start: 2021-09-10 | End: 2021-09-15

## 2021-09-11 NOTE — PROGRESS NOTES
Assessment & Plan   Acute cystitis without hematuria: dysuria, mild lower abdominal pain, frequency and positive nitrites. Very likely UTI. Will treat with macrobid, send for culture. Discussed concerning symptoms for which to return to urgent care or the ED. Push fluids, rest, OTC analgesics.  - UA macro with reflex to Microscopic and Culture - Clinc Collect  - Urine Microscopic Exam  - Urine Culture- nitroFURantoin macrocrystal-monohydrate (MACROBID) 100 MG capsule  Dispense: 10 capsule; Refill: 0     Aurea Rose MD  [facility] [place]    Joss Garnett is a 45 year old female who presents to clinic today for the following health issues:  Chief Complaint   Patient presents with     Urgent Care     UTI     c/o dysuria for 1 day     HPI    UTI    Onset of symptoms was 1day(s).  Course of illness is worsening  Severity moderate  Current and associated symptoms dysuria, frequency, urgency, blood in urine and suprapubic pain and pressure  Treatment and measures tried None  Predisposing factors include starting to use babywipes recently to wipe.  Patient denies rigors, flank pain, temperature > 101 degrees F., vomiting, vaginal discharge, vaginal odor, vaginal itching        Review of Systems  As above      Objective    /81   Pulse 89   Temp 98.3  F (36.8  C) (Tympanic)   Wt 86.2 kg (190 lb)   LMP 08/10/2021   SpO2 99%   BMI 32.61 kg/m    Physical Exam   Well appearing, no distress  Abdomen: soft, nontender, not distended, no guarding or rebound, normal bowel sounds.

## 2021-09-12 ENCOUNTER — HEALTH MAINTENANCE LETTER (OUTPATIENT)
Age: 46
End: 2021-09-12

## 2021-09-12 LAB — BACTERIA UR CULT: NO GROWTH

## 2021-09-12 NOTE — RESULT ENCOUNTER NOTE
Nothing grew out from the urine so you might not have a bladder infection, but if your symptoms are going away with the antibiotic, finish it out

## 2021-10-08 ENCOUNTER — OFFICE VISIT (OUTPATIENT)
Dept: URGENT CARE | Facility: URGENT CARE | Age: 46
End: 2021-10-08
Payer: COMMERCIAL

## 2021-10-08 VITALS
DIASTOLIC BLOOD PRESSURE: 87 MMHG | SYSTOLIC BLOOD PRESSURE: 138 MMHG | OXYGEN SATURATION: 97 % | TEMPERATURE: 98.2 F | WEIGHT: 190 LBS | HEART RATE: 80 BPM | BODY MASS INDEX: 32.61 KG/M2

## 2021-10-08 DIAGNOSIS — R07.0 THROAT PAIN: ICD-10-CM

## 2021-10-08 DIAGNOSIS — J02.0 STREP THROAT: Primary | ICD-10-CM

## 2021-10-08 LAB — DEPRECATED S PYO AG THROAT QL EIA: NEGATIVE

## 2021-10-08 PROCEDURE — 99213 OFFICE O/P EST LOW 20 MIN: CPT | Performed by: PHYSICIAN ASSISTANT

## 2021-10-08 PROCEDURE — U0005 INFEC AGEN DETEC AMPLI PROBE: HCPCS | Performed by: PHYSICIAN ASSISTANT

## 2021-10-08 PROCEDURE — U0003 INFECTIOUS AGENT DETECTION BY NUCLEIC ACID (DNA OR RNA); SEVERE ACUTE RESPIRATORY SYNDROME CORONAVIRUS 2 (SARS-COV-2) (CORONAVIRUS DISEASE [COVID-19]), AMPLIFIED PROBE TECHNIQUE, MAKING USE OF HIGH THROUGHPUT TECHNOLOGIES AS DESCRIBED BY CMS-2020-01-R: HCPCS | Performed by: PHYSICIAN ASSISTANT

## 2021-10-08 PROCEDURE — 87651 STREP A DNA AMP PROBE: CPT | Performed by: PHYSICIAN ASSISTANT

## 2021-10-08 RX ORDER — AMOXICILLIN 875 MG
875 TABLET ORAL 2 TIMES DAILY
Qty: 14 TABLET | Refills: 0 | Status: SHIPPED | OUTPATIENT
Start: 2021-10-08 | End: 2021-10-15

## 2021-10-09 LAB
GROUP A STREP BY PCR: NOT DETECTED
SARS-COV-2 RNA RESP QL NAA+PROBE: NEGATIVE

## 2021-10-09 NOTE — PATIENT INSTRUCTIONS
Patient Education     Pharyngitis: Strep (Presumed)    You have pharyngitis (sore throat). The healthcare staff may think your sore throat is caused by a bacteria called Group A streptococcus (strep). This is often called strep throat. This is diagnosed by either a rapid strep test, which gives immediate results, or a throat culture, or both. Strep throat can cause throat pain that is worse when swallowing, aching all over, headache, swollen lymph nodes or glands in the neck, and fever. The infection is contagious. It often spreads by coughing, kissing, or touching others after touching your mouth or nose. An antibiotic is given to treat the infection. Antibiotics are prescribed by doctors to treat bacterial infections, not viral infections.   Home care    Rest at home. Drink plenty of fluids so you won t get dehydrated.    Stay home from work or school for the first 24 hours of taking the antibiotics, or as directed by the healthcare provider. After this time, you won't be contagious. You can then return to work or school if you are feeling better, or as directed by the provider.     Take the antibiotic medicine for the full 10 days, or as directed by the provider, even when you feel better or your symptoms improve. This is very important to make sure the infection is fully treated. It's also important to prevent medicine-resistant germs from growing. It's also the best way to prevent rheumatic fever, which can affect your heart and other parts of the body. If you were given an antibiotic shot, your provider will tell you if more antibiotics are needed.    You may use acetaminophen or ibuprofen to control pain or fever, unless another medicine was prescribed for this. If you have chronic liver or kidney disease or ever had a stomach ulcer or gastrointestinal bleeding, talk with your provider before using these medicines.    Use throat lozenges or a throat-numbing spray to help reduce throat pain. Gargling with  warm salt water can also help reduce throat pain. Dissolve 1/2 teaspoon of salt in 1 glass of warm water.     Don t eat salty or spicy foods. These can irritate the throat.    Follow-up care  Follow up with your healthcare provider or our staff if you don't feel better in 72 hours, or as directed.   When to get medical advice  Call your healthcare provider right away if any of these occur:     Fever of 100.4 F (38 C), or higher, or as directed by your provider    New or worse ear pain, sinus pain, or headache    Painful lumps in the back of neck    Stiff neck    Lymph nodes that get larger or neck swelling    Can t open mouth wide due to throat pain    Signs of dehydration, such as very dark urine or no urine, sunken eyes, dizziness    Noisy breathing    Muffled voice    New rash    Symptoms are worse    New symptoms develop  Call 911  Call 911 if you have any of these symptoms:     Can't swallow, especially saliva, with a lot of drooling    Trouble breathing or wheezing    Feeling faint    Can't talk    Feeling of doom  Prevention  Here are steps you can take to help prevent an infection:    Keep good handwashing habits.    Don t have close contact with people who have sore throats, colds, or other upper respiratory infections.    Don t smoke, and stay away from secondhand smoke.    Stay up to date with all of your vaccines.  [employer] last reviewed this educational content on 2/1/2020 2000-2021 [employer]. All rights reserved. This information is not intended as a substitute for professional medical care. Always follow your healthcare professional's instructions.

## 2021-10-09 NOTE — PROGRESS NOTES
Throat pain  - Streptococcus A Rapid Screen w/Reflex to PCR - Clinic Collect  - Symptomatic COVID-19 Virus (Coronavirus) by PCR Nose  - amoxicillin (AMOXIL) 875 MG tablet; Take 1 tablet (875 mg) by mouth 2 times daily for 7 days  - Group A Streptococcus PCR Throat Swab    Strep throat  - amoxicillin (AMOXIL) 875 MG tablet; Take 1 tablet (875 mg) by mouth 2 times daily for 7 days    20 minutes spent on the date of the encounter doing chart review, history and exam, documentation and further activities per the note     See Patient Instructions  Patient Instructions     Patient Education     Pharyngitis: Strep (Presumed)    You have pharyngitis (sore throat). The healthcare staff may think your sore throat is caused by a bacteria called Group A streptococcus (strep). This is often called strep throat. This is diagnosed by either a rapid strep test, which gives immediate results, or a throat culture, or both. Strep throat can cause throat pain that is worse when swallowing, aching all over, headache, swollen lymph nodes or glands in the neck, and fever. The infection is contagious. It often spreads by coughing, kissing, or touching others after touching your mouth or nose. An antibiotic is given to treat the infection. Antibiotics are prescribed by doctors to treat bacterial infections, not viral infections.   Home care    Rest at home. Drink plenty of fluids so you won t get dehydrated.    Stay home from work or school for the first 24 hours of taking the antibiotics, or as directed by the healthcare provider. After this time, you won't be contagious. You can then return to work or school if you are feeling better, or as directed by the provider.     Take the antibiotic medicine for the full 10 days, or as directed by the provider, even when you feel better or your symptoms improve. This is very important to make sure the infection is fully treated. It's also important to prevent medicine-resistant germs from  growing. It's also the best way to prevent rheumatic fever, which can affect your heart and other parts of the body. If you were given an antibiotic shot, your provider will tell you if more antibiotics are needed.    You may use acetaminophen or ibuprofen to control pain or fever, unless another medicine was prescribed for this. If you have chronic liver or kidney disease or ever had a stomach ulcer or gastrointestinal bleeding, talk with your provider before using these medicines.    Use throat lozenges or a throat-numbing spray to help reduce throat pain. Gargling with warm salt water can also help reduce throat pain. Dissolve 1/2 teaspoon of salt in 1 glass of warm water.     Don t eat salty or spicy foods. These can irritate the throat.    Follow-up care  Follow up with your healthcare provider or our staff if you don't feel better in 72 hours, or as directed.   When to get medical advice  Call your healthcare provider right away if any of these occur:     Fever of 100.4 F (38 C), or higher, or as directed by your provider    New or worse ear pain, sinus pain, or headache    Painful lumps in the back of neck    Stiff neck    Lymph nodes that get larger or neck swelling    Can t open mouth wide due to throat pain    Signs of dehydration, such as very dark urine or no urine, sunken eyes, dizziness    Noisy breathing    Muffled voice    New rash    Symptoms are worse    New symptoms develop  Call 911  Call 911 if you have any of these symptoms:     Can't swallow, especially saliva, with a lot of drooling    Trouble breathing or wheezing    Feeling faint    Can't talk    Feeling of doom  Prevention  Here are steps you can take to help prevent an infection:    Keep good handwashing habits.    Don t have close contact with people who have sore throats, colds, or other upper respiratory infections.    Don t smoke, and stay away from secondhand smoke.    Stay up to date with all of your vaccines.  [employer] last  reviewed this educational content on 2/1/2020 2000-2021 [employer]. All rights reserved. This information is not intended as a substitute for professional medical care. Always follow your healthcare professional's instructions.               Kevni Deluca PA-C  [facility] U[facility]    Subjective   46 year old who presents to clinic today for the following health issues:    Urgent Care and Pharyngitis       HPI     Acute Illness  Acute illness concerns: sore throat and bilateral ear pain   Onset/Duration: 4 days  Symptoms:  Fever: no  Chills/Sweats: no  Headache (location?): YES  Sinus Pressure: no  Conjunctivitis:  no  Ear Pain: YES: bilateral  Rhinorrhea: no  Congestion: no  Sore Throat: YES (7/10)   Cough: YES-non-productive  Wheeze: YES  Decreased Appetite: no  Nausea: no  Vomiting: no  Diarrhea: no  Dysuria/Freq.: no  Dysuria or Hematuria: no  Fatigue/Achiness: no  Sick/Strep Exposure: None known  Therapies tried and outcome: Ibuprofen and tylenol     Review of Systems   Review of Systems   See HPI     Objective    Temp: 98.2  F (36.8  C) Temp src: Tympanic BP: 138/87 Pulse: 80     SpO2: 97 %       Physical Exam   Physical Exam  Constitutional:       General: She is not in acute distress.     Appearance: Normal appearance. She is normal weight. She is not ill-appearing, toxic-appearing or diaphoretic.   HENT:      Head: Normocephalic and atraumatic.      Right Ear: Tympanic membrane, ear canal and external ear normal. There is no impacted cerumen.      Left Ear: Tympanic membrane, ear canal and external ear normal. There is no impacted cerumen.      Nose: Congestion and rhinorrhea present.      Mouth/Throat:      Mouth: Mucous membranes are moist.      Pharynx: Oropharyngeal exudate and posterior oropharyngeal erythema present.   Eyes:      General: No scleral icterus.        Right eye: No discharge.         Left eye: No discharge.      Extraocular Movements: Extraocular movements  intact.      Conjunctiva/sclera: Conjunctivae normal.      Pupils: Pupils are equal, round, and reactive to light.   Cardiovascular:      Rate and Rhythm: Normal rate and regular rhythm.      Pulses: Normal pulses.      Heart sounds: Normal heart sounds. No murmur heard.   No friction rub. No gallop.    Pulmonary:      Effort: Pulmonary effort is normal. No respiratory distress.      Breath sounds: Normal breath sounds. No stridor. No wheezing, rhonchi or rales.   Chest:      Chest wall: No tenderness.   Musculoskeletal:      Cervical back: Normal range of motion and neck supple. No tenderness.   Lymphadenopathy:      Cervical: No cervical adenopathy.   Neurological:      General: No focal deficit present.      Mental Status: She is alert and oriented to person, place, and time. Mental status is at baseline.      Gait: Gait normal.   Psychiatric:         Mood and Affect: Mood normal.         Behavior: Behavior normal.         Thought Content: Thought content normal.         Judgment: Judgment normal.          Results for orders placed or performed in visit on 10/08/21 (from the past 24 hour(s))   Streptococcus A Rapid Screen w/Reflex to PCR - Clinic Collect    Specimen: Throat; Swab   Result Value Ref Range    Group A Strep antigen Negative Negative

## 2022-01-02 ENCOUNTER — HEALTH MAINTENANCE LETTER (OUTPATIENT)
Age: 47
End: 2022-01-02

## 2022-02-17 PROBLEM — F10.20 ALCOHOL DEPENDENCE, UNCOMPLICATED (H): Status: ACTIVE | Noted: 2019-04-09

## 2022-08-01 ENCOUNTER — TELEPHONE (OUTPATIENT)
Dept: FAMILY MEDICINE | Facility: CLINIC | Age: 47
End: 2022-08-01

## 2022-08-01 ENCOUNTER — NURSE TRIAGE (OUTPATIENT)
Dept: NURSING | Facility: CLINIC | Age: 47
End: 2022-08-01

## 2022-08-01 DIAGNOSIS — F41.9 ANXIETY: ICD-10-CM

## 2022-08-01 DIAGNOSIS — F40.9 FEAR FOR PERSONAL SAFETY: Primary | ICD-10-CM

## 2022-08-01 ASSESSMENT — PATIENT HEALTH QUESTIONNAIRE - PHQ9: SUM OF ALL RESPONSES TO PHQ QUESTIONS 1-9: 18

## 2022-08-01 NOTE — TELEPHONE ENCOUNTER
See triage notes for details.    PLAN:  Per protocol, advised to be seen within 3 days, video visit scheduled on 8/4. Patient verbalizes understanding.     Linda Ray RN/[place] Nurse Advisor
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
## 2022-08-01 NOTE — TELEPHONE ENCOUNTER
Contacted patient and she agreed to talk to [facility] on abnormal depression screen, suicidal thoughts    Direct transferred call to [facility]    Machelle Robb RN  [facility]      I got a message on epic that said that the patient had an abnormal depression screen. It appears that she has some suicidal thoughts and safety concerns. Please triage for more information.

## 2022-08-01 NOTE — TELEPHONE ENCOUNTER
I got a message on epic that said that the patient had an abnormal depression screen. It appears that she has some suicidal thoughts and safety concerns. Please triage for more information.    Future Appointments   Date Time Provider Department Center   8/4/2022  4:00 PM [facility]   9/6/2022  5:20 PM Maddison Lennon MD [facility]     No flowsheet data found.  PHQ 8/1/2022   PHQ-9 Total Score 18   Q9: Thoughts of better off dead/self-harm past 2 weeks More than half the days   F/U: Thoughts of suicide or self-harm No   F/U: Safety concerns Yes

## 2022-08-02 NOTE — TELEPHONE ENCOUNTER
Referral made for clinic care coordination to assist with concerns for her daughter's safety and her own anxiety. She has upcoming appointment to discuss anxiety with me as well.     Future Appointments   Date Time Provider Department Center   8/4/2022  4:00 PM Gisella Licea MD [facility]   9/6/2022  5:20 PM Maddison Lennon MD [facility]     Mariola was seen today for anxiety.    Diagnoses and all orders for this visit:    Fear for personal safety  -     Primary Care - Care Coordination Referral; Future    Anxiety  -     Primary Care - Care Coordination Referral; Future

## 2022-08-04 ENCOUNTER — VIRTUAL VISIT (OUTPATIENT)
Dept: FAMILY MEDICINE | Facility: CLINIC | Age: 47
End: 2022-08-04
Payer: COMMERCIAL

## 2022-08-04 DIAGNOSIS — F31.4 BIPOLAR DISORDER, CURRENT EPISODE DEPRESSED, SEVERE, WITHOUT PSYCHOTIC FEATURES (H): Primary | ICD-10-CM

## 2022-08-04 DIAGNOSIS — F10.20 ALCOHOL DEPENDENCE, UNCOMPLICATED (H): ICD-10-CM

## 2022-08-04 DIAGNOSIS — F51.5 NIGHTMARE: ICD-10-CM

## 2022-08-04 DIAGNOSIS — F43.89 REACTION TO CHRONIC STRESS: ICD-10-CM

## 2022-08-04 DIAGNOSIS — F17.200 SMOKING: ICD-10-CM

## 2022-08-04 DIAGNOSIS — F43.10 PTSD (POST-TRAUMATIC STRESS DISORDER): ICD-10-CM

## 2022-08-04 PROCEDURE — 99214 OFFICE O/P EST MOD 30 MIN: CPT | Mod: GT | Performed by: FAMILY MEDICINE

## 2022-08-04 RX ORDER — CLONIDINE HYDROCHLORIDE 0.1 MG/1
0.1 TABLET ORAL AT BEDTIME
Qty: 30 TABLET | Refills: 0 | Status: SHIPPED | OUTPATIENT
Start: 2022-08-04 | End: 2022-08-04

## 2022-08-04 RX ORDER — SERTRALINE HYDROCHLORIDE 25 MG/1
25 TABLET, FILM COATED ORAL DAILY
Qty: 45 TABLET | Refills: 0 | Status: CANCELLED | OUTPATIENT
Start: 2022-08-04

## 2022-08-04 RX ORDER — PRAZOSIN HYDROCHLORIDE 1 MG/1
1 CAPSULE ORAL AT BEDTIME
Qty: 30 CAPSULE | Refills: 0 | Status: SHIPPED | OUTPATIENT
Start: 2022-08-04 | End: 2022-08-18

## 2022-08-04 RX ORDER — QUETIAPINE FUMARATE 100 MG/1
100 TABLET, FILM COATED ORAL AT BEDTIME
Qty: 30 TABLET | Refills: 0 | Status: SHIPPED | OUTPATIENT
Start: 2022-08-04 | End: 2022-08-18

## 2022-08-04 ASSESSMENT — ANXIETY QUESTIONNAIRES
IF YOU CHECKED OFF ANY PROBLEMS ON THIS QUESTIONNAIRE, HOW DIFFICULT HAVE THESE PROBLEMS MADE IT FOR YOU TO DO YOUR WORK, TAKE CARE OF THINGS AT HOME, OR GET ALONG WITH OTHER PEOPLE: SOMEWHAT DIFFICULT
3. WORRYING TOO MUCH ABOUT DIFFERENT THINGS: NOT AT ALL
2. NOT BEING ABLE TO STOP OR CONTROL WORRYING: NOT AT ALL
7. FEELING AFRAID AS IF SOMETHING AWFUL MIGHT HAPPEN: NOT AT ALL
6. BECOMING EASILY ANNOYED OR IRRITABLE: NEARLY EVERY DAY
7. FEELING AFRAID AS IF SOMETHING AWFUL MIGHT HAPPEN: NOT AT ALL
1. FEELING NERVOUS, ANXIOUS, OR ON EDGE: SEVERAL DAYS
8. IF YOU CHECKED OFF ANY PROBLEMS, HOW DIFFICULT HAVE THESE MADE IT FOR YOU TO DO YOUR WORK, TAKE CARE OF THINGS AT HOME, OR GET ALONG WITH OTHER PEOPLE?: SOMEWHAT DIFFICULT
GAD7 TOTAL SCORE: 4
IF YOU CHECKED OFF ANY PROBLEMS ON THIS QUESTIONNAIRE, HOW DIFFICULT HAVE THESE PROBLEMS MADE IT FOR YOU TO DO YOUR WORK, TAKE CARE OF THINGS AT HOME, OR GET ALONG WITH OTHER PEOPLE: SOMEWHAT DIFFICULT
GAD7 TOTAL SCORE: 4
1. FEELING NERVOUS, ANXIOUS, OR ON EDGE: SEVERAL DAYS
5. BEING SO RESTLESS THAT IT IS HARD TO SIT STILL: NEARLY EVERY DAY
4. TROUBLE RELAXING: NOT AT ALL
6. BECOMING EASILY ANNOYED OR IRRITABLE: NOT AT ALL
GAD7 TOTAL SCORE: 4
2. NOT BEING ABLE TO STOP OR CONTROL WORRYING: NOT AT ALL
5. BEING SO RESTLESS THAT IT IS HARD TO SIT STILL: NOT AT ALL
3. WORRYING TOO MUCH ABOUT DIFFERENT THINGS: NOT AT ALL

## 2022-08-04 ASSESSMENT — PATIENT HEALTH QUESTIONNAIRE - PHQ9
SUM OF ALL RESPONSES TO PHQ QUESTIONS 1-9: 18
5. POOR APPETITE OR OVEREATING: NOT AT ALL
10. IF YOU CHECKED OFF ANY PROBLEMS, HOW DIFFICULT HAVE THESE PROBLEMS MADE IT FOR YOU TO DO YOUR WORK, TAKE CARE OF THINGS AT HOME, OR GET ALONG WITH OTHER PEOPLE: SOMEWHAT DIFFICULT

## 2022-08-04 NOTE — PATIENT INSTRUCTIONS
Recovering from depression takes time. If you are on medications keep taking your medication, let your doctor know if you are experiencing any side effects.  Use your  Depression Survival Kit . Follow-up with therapy and/or your doctor as recommended. If you feel you might ever harm yourself or another person, you should go to the emergency room or call 911 immediately    Coping with Depression
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
## 2022-08-04 NOTE — COMMUNITY RESOURCES LIST (ENGLISH)
08/04/2022   [facility] - Outpatient Clinics  N/A  For questions about this resource list or additional care needs, please contact your primary care clinic or care manager.  Phone: [phone]   Email: N/A   Address: [address] [place] [address]   Hours: N/A        Hotlines and Helplines       Hotline - Crisis help  1  [facility] Distance: 2.15 miles      COVID-19 Status: Phone/Virtual   [address] [place] [address]  Language: English, Hmong, Macedonian  Hours: Mon - Sun Open 24 Hours   Phone: (843) 658-9175 Website: https://www.[facility]./[facility]/health-medical/clinics-services/mental-behavorial-health/adult-mental-health-chemical-health/urgent-care-adult-mental-health     2  [place] Department of Human Services - Crisis Text Line - Crisis Text Line Distance: 2.41 miles      COVID-19 Status: Phone/Virtual   [address] [place] [address]  Language: English  Hours: Mon - Sun Open 24 Hours   Website: https://[place].gov/dhs/partners-and-providers/policies-procedures/adult-mental-health/crisis-text-line/          Mental Health       Individual counseling  3  [facility] Distance: 1.03 miles      COVID-19 Status: Phone/Virtual   [address] [place] [address]  Language: English  Hours: Mon - Fri 8:00 AM - 5:00 PM  Fees: Insurance, Self Pay   Phone: (525) 778-8846 Email: [email or web address] Health     4  [employer] [employer]. Distance: 1.81 miles      COVID-19 Status: Phone/Virtual   [address] [place] [address]  Language: English, Hmong, Tajik  Hours: Mon - Fri 9:00 AM - 5:00 PM  Fees: Insurance, Self Pay   Phone: (299) 670-7262 Email: info@Kleer.org Website: http://www.metrosocialservices.org/     Mental health crisis care  5  [facility] - Adult Program Distance: 2.15 miles      COVID-19 Status: Regular Operations   [address] [facility]  [place] [address]  Language: English, Hmong, Qatari  Hours: Mon - Fri 8:00 AM - 5:30 PM  Fees: Insurance, Self Pay, Sliding Fee   Phone: (343) 212-9784 Website: https://www.[facility]./residents/health-medical/clinics-services/mental-behavorial-health/adult-mental-health-chemical-health/urgent-care-adult-mental-health     6  [facility] Distance: 5.13 miles      COVID-19 Status: Regular Operations, COVID-19 Status: Phone/Virtual   [address] [place] [address]  Language: English, Montenegrin  Hours: Mon - Fri 9:00 AM - 5:00 PM  Fees: Insurance, Self Pay   Phone: (110) 481-6916 Website: http://[employer]/index.php     Mental health support group  7  [facility] Distance: 2.63 miles      COVID-19 Status: Phone/Virtual   [address] [facility] [address] [place] [address]  Language: English  Hours: Mon - Fri 8:00 AM - 4:30 PM  Fees: Free   Phone: (637) 617-8818 Website: https://www.[place].gov/find-locations/facility/vc_0416V     8  [employer] - Emotions Anonymous Distance: 3.2 miles      COVID-19 Status: Regular Operations, COVID-19 Status: Phone/Virtual   [address] [place] [address]  Language: English  Hours: Mon - Thu 12:00 PM - 5:00 PM  Fees: Free   Phone: (331) 151-8052 Email: info@emotionsanonymous.org Website: http://emotionsanonymous.org/          Important Numbers & Websites       Emergency Services   911  [facility]   311  Poison Control   (843) 906-9436  Suicide Prevention Lifeline   (568) 929-4525 (TALK)  Child Abuse Hotline   (992) 851-5342 (4-A-Child)  Sexual Assault Hotline   (611) 936-9802 (HOPE)  National Runaway Safeline   (529) 269-7968 (RUNAWAY)  All-Options Talkline   (220) 574-2562  Substance Abuse Referral   (180) 839-3632 (HELP)

## 2022-08-12 ENCOUNTER — PATIENT OUTREACH (OUTPATIENT)
Dept: NURSING | Facility: CLINIC | Age: 47
End: 2022-08-12
Attending: FAMILY MEDICINE

## 2022-08-12 DIAGNOSIS — F41.9 ANXIETY: ICD-10-CM

## 2022-08-12 DIAGNOSIS — F40.9 FEAR FOR PERSONAL SAFETY: ICD-10-CM

## 2022-08-12 SDOH — ECONOMIC STABILITY: INCOME INSECURITY: IN THE LAST 12 MONTHS, WAS THERE A TIME WHEN YOU WERE NOT ABLE TO PAY THE MORTGAGE OR RENT ON TIME?: YES

## 2022-08-12 SDOH — ECONOMIC STABILITY: TRANSPORTATION INSECURITY
IN THE PAST 12 MONTHS, HAS THE LACK OF TRANSPORTATION KEPT YOU FROM MEDICAL APPOINTMENTS OR FROM GETTING MEDICATIONS?: NO

## 2022-08-12 SDOH — ECONOMIC STABILITY: FOOD INSECURITY: WITHIN THE PAST 12 MONTHS, THE FOOD YOU BOUGHT JUST DIDN'T LAST AND YOU DIDN'T HAVE MONEY TO GET MORE.: SOMETIMES TRUE

## 2022-08-12 SDOH — ECONOMIC STABILITY: FOOD INSECURITY: WITHIN THE PAST 12 MONTHS, YOU WORRIED THAT YOUR FOOD WOULD RUN OUT BEFORE YOU GOT MONEY TO BUY MORE.: SOMETIMES TRUE

## 2022-08-12 SDOH — ECONOMIC STABILITY: TRANSPORTATION INSECURITY
IN THE PAST 12 MONTHS, HAS LACK OF TRANSPORTATION KEPT YOU FROM MEETINGS, WORK, OR FROM GETTING THINGS NEEDED FOR DAILY LIVING?: NO

## 2022-08-12 SDOH — HEALTH STABILITY: PHYSICAL HEALTH: ON AVERAGE, HOW MANY DAYS PER WEEK DO YOU ENGAGE IN MODERATE TO STRENUOUS EXERCISE (LIKE A BRISK WALK)?: 7 DAYS

## 2022-08-12 SDOH — HEALTH STABILITY: PHYSICAL HEALTH: ON AVERAGE, HOW MANY MINUTES DO YOU ENGAGE IN EXERCISE AT THIS LEVEL?: 30 MIN

## 2022-08-12 ASSESSMENT — LIFESTYLE VARIABLES
HOW OFTEN DO YOU HAVE SIX OR MORE DRINKS ON ONE OCCASION: NEVER
HOW OFTEN DO YOU HAVE A DRINK CONTAINING ALCOHOL: NEVER
SKIP TO QUESTIONS 9-10: 1
AUDIT-C TOTAL SCORE: 0
HOW MANY STANDARD DRINKS CONTAINING ALCOHOL DO YOU HAVE ON A TYPICAL DAY: PATIENT DOES NOT DRINK

## 2022-08-12 ASSESSMENT — SOCIAL DETERMINANTS OF HEALTH (SDOH)
HOW OFTEN DO YOU GET TOGETHER WITH FRIENDS OR RELATIVES?: TWICE A WEEK
ARE YOU MARRIED, WIDOWED, DIVORCED, SEPARATED, NEVER MARRIED, OR LIVING WITH A PARTNER?: NEVER MARRIED
DO YOU BELONG TO ANY CLUBS OR ORGANIZATIONS SUCH AS CHURCH GROUPS UNIONS, FRATERNAL OR ATHLETIC GROUPS, OR SCHOOL GROUPS?: YES
HOW OFTEN DO YOU ATTENT MEETINGS OF THE CLUB OR ORGANIZATION YOU BELONG TO?: MORE THAN 4 TIMES PER YEAR
IN A TYPICAL WEEK, HOW MANY TIMES DO YOU TALK ON THE PHONE WITH FAMILY, FRIENDS, OR NEIGHBORS?: MORE THAN THREE TIMES A WEEK
HOW OFTEN DO YOU ATTEND CHURCH OR RELIGIOUS SERVICES?: NEVER
HOW HARD IS IT FOR YOU TO PAY FOR THE VERY BASICS LIKE FOOD, HOUSING, MEDICAL CARE, AND HEATING?: HARD

## 2022-08-12 ASSESSMENT — ACTIVITIES OF DAILY LIVING (ADL): DEPENDENT_IADLS:: INDEPENDENT
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
## 2022-08-12 NOTE — LETTER
[facility] CARE COORDINATION  [address] [facility] [address]  August 15, 2022    Mariola Abby Char  [address]  [place] [address]      Dear Mariola,        I am a clinic care coordinator who works with Gisella Licea MD with the [facility]. I wanted to thank you for spending the time to talk with me.  Below is a description of clinic care coordination and how I can further assist you.       The clinic care coordination team is made up of a registered nurse, , financial resource worker and community health worker who understand the health care system. The goal of clinic care coordination is to help you manage your health and improve access to the health care system. Our team works alongside your provider to assist you in determining your health and social needs. We can help you obtain health care and community resources, providing you with necessary information and education. We can work with you through any barriers and develop a care plan that helps coordinate and strengthen the communication between you and your care team.    Please feel free to contact me with any questions or concerns regarding care coordination and what we can offer.      We are focused on providing you with the highest-quality healthcare experience possible.    Sincerely,     SHANNA Rosales  [facility]  Clinic Care Coordinator  [phone]                Here are the resources that we discussed from last week    IPV (Intimate Partner Violence) resources    [facility] DV Intervention Project  Https://www.stpaulintervention.org/  (713) 794-4844    Bridges to Safety  https://www.bridgestosafety.org/  (282) 402-2489    National Domestic Violence Hotline  Https://www.[employer].org/  5-880-396-8500    Safety Planning  https://www.SixthEye/develop-a-safety-plan-for-an-abused-teen-5113941      Mental Health Resources:    [employer]: (free yoga on Wednesday  evenings & Saturday mornings)  https://www.MedyMatch.org/event-calendar/?gclid=LGLaTVgiXpVW-[email or web address]    [place] [facility]:  Https://paulyogacenter.com/    RAISA [place]:  https://namimn.org/support/[place]-support-groups/

## 2022-08-12 NOTE — COMMUNITY RESOURCES LIST (ENGLISH)
08/12/2022   [facility] Outpatient Clinics  N/A  For questions about this resource list or additional care needs, please contact your primary care clinic or care manager.  Phone: [phone]   Email: N/A   Address: [address] [place] [address]   Hours: N/A        Financial Stability       Health insurance application assistance  1  [facility] Distance: 1.01 miles      COVID-19 Status: Phone/Virtual   [address] [place] [address]  Language: English  Hours: Mon 8:00 AM - 5:00 PM , Wed 8:00 AM - 5:00 PM  Fees: Free   Phone: (557) 543-1940 Email: info@[employer]iohealth.org Website: http://TrustDegrees.org     2  [facility] Distance: 1.81 miles      COVID-19 Status: Regular Operations, COVID-19 Status: Phone/Virtual   [address] [place] [address]  Language: English, Nayeli, Swiss  Hours: Mon - Fri 8:00 AM - 4:00 PM  Fees: Free   Phone: (768) 261-6521 Email: info@[employer].org Website: http://www.Linux Voicen.org/     Rent and mortgage payment assistance  3  [facility] Distance: 2.08 miles      COVID-19 Status: Phone/Virtual   [address] [place] [address]  Language: English  Hours: Mon - Fri 9:00 AM - 4:00 PM  Fees: Free   Phone: (647) 415-6112 Website: http://[facility].org/[facility]/     4  [place] Assistance [place] for [facility] ([facility]) [facility] Distance: 2.31 miles      COVID-19 Status: Phone/Virtual   [address] [facility] [address] [place] [address]  Language: English  Hours: Mon - Fri 8:00 AM - 4:00 PM  Fees: Free   Phone: (361) 144-7475 Email: mac-v@[employer].org Website: http://www.[employer].org/index.php     Utility payment assistance  5  [facility] - [facility] Distance: 2.08 miles      COVID-19 Status: Phone/Virtual   [address] [place] [address]  Language: English  Hours: Mon - Fri 9:00 AM  - 4:00 PM  Fees: Free   Phone: (855) 122-7132 Website: http://[facility].org/[facility]/fr-xozk-icfwt-ave/     6  [facility] [employer] ([facility]) [facility] - Supportive Housing Assistance Program (SHAP) Distance: 2.4 miles      COVID-19 Status: Phone/Virtual   [address] [place] [address]  Language: English, Hmong, Nyaeli, Eritrean  Hours: Mon - Fri 8:30 AM - 5:00 PM  Fees: Free   Phone: (531) 175-6308 Email: kyle@[employer]X.org Website: http://www.ong.org/hap-impact-areas/          Food and Nutrition       Food pantry  7  [facility] [address] Food Shelf Distance: 0.41 miles      COVID-19 Status: Delivery, COVID-19 Status: Pickup   [address] [facility] [address] [place] [address]  Language: English  Hours: Mon - Fri 10:00 AM - 12:30 PM , Mon - Fri 2:00 PM - 4:30 PM  Fees: Free   Phone: (469) 323-9081 Website: http://silkfred/food-shelves/     8  MORE Distance: 1.01 miles      COVID-19 Status: Pickup   [address] [place] [address]  Language: English, Nayeli, Ukrainian, Ecuadorean, Paraguayan  Hours: Fri 9:30 AM - 12:00 PM Appt. Only  Fees: Free   Phone: (314) 809-5119 Email: info@more-empowerment.org Website: http://www.more-empowerment.org/          Important Numbers & Websites       Emergency Services   911  City Services   311  Poison Control   (518) 297-3347  Suicide Prevention Lifeline   (152) 438-3729 (TALK)  Child Abuse Hotline   (512) 460-7354 (4-A-Child)  Sexual Assault Hotline   (634) 964-7720 (HOPE)  National Runaway Safeline   (180) 900-9904 (RUNAWAY)  All-Options Talkline   (839) 689-7685  Substance Abuse Referral   (548) 654-6131 (HELP)

## 2022-08-12 NOTE — COMMUNITY RESOURCES LIST (ENGLISH)
08/12/2022   [facility] Outpatient Clinics  N/A  For questions about this resource list or additional care needs, please contact your primary care clinic or care manager.  Phone: [phone]   Email: N/A   Address: [address] [place] [address]   Hours: N/A        Financial Stability       Health insurance application assistance  1  [facility] Distance: 1.01 miles      COVID-19 Status: Phone/Virtual   [address] [place] [address]  Language: English  Hours: Mon 8:00 AM - 5:00 PM , Wed 8:00 AM - 5:00 PM  Fees: Free   Phone: (658) 515-9108 Email: info@[employer].org Website: http://[employer].org     2  [facility] Distance: 1.81 miles      COVID-19 Status: Regular Operations, COVID-19 Status: Phone/Virtual   [address] [place] [address]  Language: English, Nayeli, Chadian  Hours: Mon - Fri 8:00 AM - 4:00 PM  Fees: Free   Phone: (180) 180-4623 Email: info@Qluen.org Website: http://www.Hezmedia Interactiven.org/     Rent and mortgage payment assistance  3  [facility] Distance: 2.08 miles      COVID-19 Status: Phone/Virtual   [address] [place] [address]  Language: English  Hours: Mon - Fri 9:00 AM - 4:00 PM  Fees: Free   Phone: (797) 435-7489 Website: http://[facility] Hospital[facility]orth.org/[facility]/     4  [place] Assistance [place] for Veterans ([facility]) [facility] Distance: 2.31 miles      COVID-19 Status: Phone/Virtual   [address] [facility] [address] [place] [address]  Language: English  Hours: Mon - Fri 8:00 AM - 4:00 PM  Fees: Free   Phone: (244) 767-3595 Email: mac-v@[employer].org Website: http://www.[employer].org/index.php     Utility payment assistance  5  [facility] Distance: 2.08 miles      COVID-19 Status: Phone/Virtual   [address] [place] [address]  Language: English  Hours: Mon - Fri 9:00 AM  - 4:00 PM  Fees: Free   Phone: (595) 655-4696 Website: http://[facility].org/[facility]-ave/     6  [facility] ([facility]) [facility] - Supportive Housing Assistance Program ([facility]) Distance: 2.4 miles      COVID-19 Status: Phone/Virtual   [address] [place] [address]  Language: English, Hmong, Nayeli, Belizean  Hours: Mon - Fri 8:30 AM - 5:00 PM  Fees: Free   Phone: (510) 943-7197 Email: kyle@ong.org Website: http://www.ong.org/[facility]-impact-areas/          Food and Nutrition       Food pantry  7  [facility] [address] Food Shelf Distance: 0.41 miles      COVID-19 Status: Delivery, COVID-19 Status: Pickup   [address] [address]  Language: English  Hours: Mon - Fri 10:00 AM - 12:30 PM , Mon - Fri 2:00 PM - 4:30 PM  Fees: Free   Phone: (984) 645-7030 Website: http://Need/food-shelves/     8  MORE Distance: 1.01 miles      COVID-19 Status: Pickup   [address] [place] [address]  Language: English, Nayeli, Turkish, Uzbek, South Sudanese  Hours: Fri 9:30 AM - 12:00 PM Appt. Only  Fees: Free   Phone: (263) 983-3182 Email: info@more-empowerment.org Website: http://www.more-empowerment.org/          Mental Health       Individual counseling  9  [facility] Distance: 1.03 miles      COVID-19 Status: Phone/Virtual   [address] [place] [address]  Language: English  Hours: Mon - Fri 8:00 AM - 5:00 PM  Fees: Insurance, Self Pay   Phone: (317) 338-5810 Email: aron@Naytev     10  [employer]. Distance: 1.81 miles      COVID-19 Status: Phone/Virtual   [address] [place] [address]  Language: English, Hmong, Belizean  Hours: Mon - Fri 9:00 AM - 5:00 PM  Fees: Insurance, Self Pay   Phone: (581) 713-4060 Email: info@[employer].Yeahka Website: http://www.Halles.org/     Mental health support group  11  [facility] Distance:  2.63 miles      COVID-19 Status: Phone/Virtual   [address] [place] [address]  Language: English  Hours: Mon - Fri 8:00 AM - 4:30 PM  Fees: Free   Phone: (824) 642-1163 Website: https://www.va.gov/find-locations/facility/vc_0416V     12  [employer] - Em[employer] Anonymous Distance: 3.2 miles      COVID-19 Status: Regular Operations, COVID-19 Status: Phone/Virtual   [address] [place] [address]  Language: English  Hours: Mon - Thu 12:00 PM - 5:00 PM  Fees: Free   Phone: (663) 865-6108 Email: info@POKKT.org Website: http://POKKT.org/          Important Numbers & Websites       Emergency Services   911  City Services   311  Poison Control   (651) 182-2374  Suicide Prevention Lifeline   (608) 181-8238 (TALK)  Child Abuse Hotline   (692) 135-8028 (4-A-Child)  Sexual Assault Hotline   (284) 636-8553 (HOPE)  National Runaway Safeline   (856) 905-6049 (RUNAWAY)  All-Options Talkline   (420) 353-2292  Substance Abuse Referral   (489) 336-5143 (HELP)

## 2022-08-12 NOTE — COMMUNITY RESOURCES LIST (ENGLISH)
08/12/2022   [facility]  N/A  For questions about this resource list or additional care needs, please contact your primary care clinic or care manager.  Phone: [phone]   Email: N/A   Address: [address] [place] [address]   Hours: N/A        Financial Stability       Health insurance application assistance  1  [facility] Distance: 1.01 miles      COVID-19 Status: Phone/Virtual   [address] [place] [address]  Language: English  Hours: Mon 8:00 AM - 5:00 PM , Wed 8:00 AM - 5:00 PM  Fees: Free   Phone: (968) 674-7292 Email: info@ShopSociallyhealth.org Website: http://GLOBALGROUP INVESTMENT HOLDINGS.org     2  [facility] Distance: 1.81 miles      COVID-19 Status: Regular Operations, COVID-19 Status: Phone/Virtual   [address] [place] [address]  Language: English, Nayeli, Omani  Hours: Mon - Fri 8:00 AM - 4:00 PM  Fees: Free   Phone: (739) 365-5671 Email: info@Loylty Rewardz Managementn.org Website: http://www.Fashion Movementn.org/     Rent and mortgage payment assistance  3  [facility] Distance: 2.08 miles      COVID-19 Status: Phone/Virtual   [address] [place] [address]  Language: English  Hours: Mon - Fri 9:00 AM - 4:00 PM  Fees: Free   Phone: (940) 304-5081 Website: http://[facility] Allen[facility]orth.org/[facility]/     4  [place] Assistance [place] for Veterans ([facility]) [facility] Distance: 2.31 miles      COVID-19 Status: Phone/Virtual   1000 [facility] [address] [place] [address]  Language: English  Hours: Mon - Fri 8:00 AM - 4:00 PM  Fees: Free   Phone: (422) 987-9626 Email: mac-v@Huoshi.org Website: http://www.Huoshi.org/index.php     Utility payment assistance  5  [facility] Distance: 2.08 miles      COVID-19 Status: Phone/Virtual   [address] [place] [address]  Language: English  Hours: Mon - Fri 9:00 AM  - 4:00 PM  Fees: Free   Phone: (932) 734-9151 Website: http://[facility].org/[facility]-ave/     6  [facility] American [facility] ([facility]) [facility] - Supportive Housing Assistance Program ([facility]) Distance: 2.4 miles      COVID-19 Status: Phone/Virtual   [address] [place] [address]  Language: English, Hmong, Nayeli, Citizen of [place]  Hours: Mon - Fri 8:30 AM - 5:00 PM  Fees: Free   Phone: (222) 196-8638 Email: kyle@ong.org Website: http://www.ong.org/[facility]-impact-areas/          Food and Nutrition       Food pantry  7  [facility] [address] Food Shelf Distance: 0.41 miles      COVID-19 Status: Delivery, COVID-19 Status: Pickup   [address] [address]  Language: English  Hours: Mon - Fri 10:00 AM - 12:30 PM , Mon - Fri 2:00 PM - 4:30 PM  Fees: Free   Phone: (496) 131-8772 Website: http://FounderSync/food-shelves/     8  MORE Distance: 1.01 miles      COVID-19 Status: Pickup   [address] [place] [address]  Language: English, Nayeli, Korean, Singaporean, Cook Islander  Hours: Fri 9:30 AM - 12:00 PM Appt. Only  Fees: Free   Phone: (502) 744-2634 Email: info@more-empowerment.org Website: http://www.more-empowerment.org/          Mental Health       Individual counseling  9  [facility] Distance: 1.03 miles      COVID-19 Status: Phone/Virtual   [address] [place] [address]  Language: English  Hours: Mon - Fri 8:00 AM - 5:00 PM  Fees: Insurance, Self Pay   Phone: (161) 558-3477 Email: [email or web address]     10  besomebody.. Distance: 1.81 miles      COVID-19 Status: Phone/Virtual   [address] [place] [address]  Language: English, Hmong, Citizen of [place]  Hours: Mon - Fri 9:00 AM - 5:00 PM  Fees: Insurance, Self Pay   Phone: (306) 393-5158 Email: info@HiringSolved.Omni Helicopters International Website: http://www.TuTandaes.org/     Mental health support group  11  [facility] Distance:  2.63 miles      COVID-19 Status: Phone/Virtual   [address] [place] [address]  Language: English  Hours: Mon - Fri 8:00 AM - 4:30 PM  Fees: Free   Phone: (357) 644-6082 Website: https://www.va.gov/find-locations/facility/vc_0416V     12  Em[employer] Anonymous [employer] - Emotions Anonymous Distance: 3.2 miles      COVID-19 Status: Regular Operations, COVID-19 Status: Phone/Virtual   [address] [place] [address]  Language: English  Hours: Mon - Thu 12:00 PM - 5:00 PM  Fees: Free   Phone: (492) 121-5401 Email: info@Tira Wireless.org Website: http://Tira Wireless.org/          Important Numbers & Websites       Emergency Services   911  City Services   311  Poison Control   (209) 176-3643  Suicide Prevention Lifeline   (562) 450-7289 (TALK)  Child Abuse Hotline   (517) 342-6254 (4-A-Child)  Sexual Assault Hotline   (186) 997-6920 (HOPE)  National Runaway Safeline   (280) 219-2152 (RUNAWAY)  All-Options Talkline   (699) 230-1101  Substance Abuse Referral   (748) 914-5017 (HELP)

## 2022-08-12 NOTE — COMMUNITY RESOURCES LIST (ENGLISH)
08/12/2022   [facility]  N/A  For questions about this resource list or additional care needs, please contact your primary care clinic or care manager.  Phone: [phone]   Email: N/A   Address: [address] [place] [address]   Hours: N/A        Financial Stability       Health insurance application assistance  1  [facility] Distance: 1.01 miles      COVID-19 Status: Phone/Virtual   [address] [place] [address]  Language: English  Hours: Mon 8:00 AM - 5:00 PM , Wed 8:00 AM - 5:00 PM  Fees: Free   Phone: (842) 917-9714 Email: info@Sensika Technologieshealth.org Website: http://Illumitex.org     2  [facility] [employer] [facility] Distance: 1.81 miles      COVID-19 Status: Regular Operations, COVID-19 Status: Phone/Virtual   [address] [place] [address]  Language: English, Nayeli, Barbadian  Hours: Mon - Fri 8:00 AM - 4:00 PM  Fees: Free   Phone: (843) 129-5876 Email: info@GoSpotCheckn.org Website: http://www.China Horizon Investmentsn.org/     Rent and mortgage payment assistance  3  [facility] Distance: 2.08 miles      COVID-19 Status: Phone/Virtual   [address] [place] [address]  Language: English  Hours: Mon - Fri 9:00 AM - 4:00 PM  Fees: Free   Phone: (239) 722-6017 Website: http://AdventHealtharmorth.org/[facility]/     4  [place] Assistance [place] for Veterans ([facility]) [facility] Distance: 2.31 miles      COVID-19 Status: Phone/Virtual   1000 [facility] [address] [place] [address]  Language: English  Hours: Mon - Fri 8:00 AM - 4:00 PM  Fees: Free   Phone: (133) 698-8728 Email: mac-v@WellRight.org Website: http://www.WellRight.org/index.php     Utility payment assistance  5  [facility] Distance: 2.08 miles      COVID-19 Status: Phone/Virtual   [address] [place] [address]  Language: English  Hours: Mon - Fri 9:00 AM  - 4:00 PM  Fees: Free   Phone: (788) 615-4493 Website: http://[facility].org/[facility]-ave/     6  [facility] [place] American [facility] ([facility]) [facility] - Supportive Housing Assistance Program ([facility]) Distance: 2.4 miles      COVID-19 Status: Phone/Virtual   [address] [place] [address]  Language: English, Hmong, Nayeli, Indonesian  Hours: Mon - Fri 8:30 AM - 5:00 PM  Fees: Free   Phone: (715) 398-7634 Email: kyle@ong.org Website: http://www.ong.org/[facility]-impact-areas/          Food and Nutrition       Food pantry  7  [facility] [address] Food Shelf Distance: 0.41 miles      COVID-19 Status: Delivery, COVID-19 Status: Pickup   [address] [address]  Language: English  Hours: Mon - Fri 10:00 AM - 12:30 PM , Mon - Fri 2:00 PM - 4:30 PM  Fees: Free   Phone: (751) 339-9990 Website: http://LikeWhere/food-shelves/     8  MORE Distance: 1.01 miles      COVID-19 Status: Pickup   [address] [place] [address]  Language: English, Nayeli, Yi, Moldovan, Chinese  Hours: Fri 9:30 AM - 12:00 PM Appt. Only  Fees: Free   Phone: (152) 906-1453 Email: info@more-empowerment.org Website: http://www.more-empowerment.org/          Mental Health       Individual counseling  9  [facility] Distance: 1.03 miles      COVID-19 Status: Phone/Virtual   [address] [place] [address]  Language: English  Hours: Mon - Fri 8:00 AM - 5:00 PM  Fees: Insurance, Self Pay   Phone: (564) 576-5337 Email: aron@[email or web address]     10  [employer]. Distance: 1.81 miles      COVID-19 Status: Phone/Virtual   [address] [place] [address]  Language: English, Hmong, Indonesian  Hours: Mon - Fri 9:00 AM - 5:00 PM  Fees: Insurance, Self Pay   Phone: (480) 506-8303 Email: info@Movebubble.[employer] Website: http://www.Giggzoes.org/     Mental health support group  11  [facility] Distance:  2.63 miles      COVID-19 Status: Phone/Virtual   [address] [place] [address]  Language: English  Hours: Mon - Fri 8:00 AM - 4:30 PM  Fees: Free   Phone: (413) 675-8297 Website: https://www.va.gov/find-locations/facility/vc_0416V     12  Em[employer] Anonymous [employer] - Emotions Anonymous Distance: 3.2 miles      COVID-19 Status: Regular Operations, COVID-19 Status: Phone/Virtual   [address] [place] [address]  Language: English  Hours: Mon - Thu 12:00 PM - 5:00 PM  Fees: Free   Phone: (568) 599-9709 Email: info@Emefcy.org Website: http://Emefcy.org/          Important Numbers & Websites       Emergency Services   911  City Services   311  Poison Control   (673) 804-5315  Suicide Prevention Lifeline   (777) 655-2030 (TALK)  Child Abuse Hotline   (668) 233-4352 (4-A-Child)  Sexual Assault Hotline   (776) 809-4417 (HOPE)  National Runaway Safeline   (463) 867-3429 (RUNAWAY)  All-Options Talkline   (411) 403-4451  Substance Abuse Referral   (873) 601-8623 (HELP)

## 2022-08-12 NOTE — COMMUNITY RESOURCES LIST (ENGLISH)
08/12/2022   [facility]  N/A  For questions about this resource list or additional care needs, please contact your primary care clinic or care manager.  Phone: [phone]   Email: N/A   Address: [address] [place] [address]   Hours: N/A        Financial Stability       Health insurance application assistance  1  [facility] Distance: 1.01 miles      COVID-19 Status: Phone/Virtual   [address] [place] [address]  Language: English  Hours: Mon 8:00 AM - 5:00 PM , Wed 8:00 AM - 5:00 PM  Fees: Free   Phone: (410) 281-6329 Email: info@Bee Warehealth.org Website: http://Sentric Music.org     2  [employer] [facility] Distance: 1.81 miles      COVID-19 Status: Regular Operations, COVID-19 Status: Phone/Virtual   [address] [place] [address]  Language: English, Nayeli, Equatorial Guinean  Hours: Mon - Fri 8:00 AM - 4:00 PM  Fees: Free   Phone: (677) 430-2219 Email: info@SnapNamesn.org Website: http://www.TeachBoostn.org/     Rent and mortgage payment assistance  3  [facility] Distance: 2.08 miles      COVID-19 Status: Phone/Virtual   [address] [place] [address]  Language: English  Hours: Mon - Fri 9:00 AM - 4:00 PM  Fees: Free   Phone: (836) 949-2052 Website: http://[facility] Center[facility].org/[facility]/     4  [place] Assistance [place] for Veterans (List of [place] hospitals according to the [facility]) [facility] Distance: 2.31 miles      COVID-19 Status: Phone/Virtual   1000 [facility] [address] [place] [address]  Language: English  Hours: Mon - Fri 8:00 AM - 4:00 PM  Fees: Free   Phone: (435) 354-1750 Email: mac-v@BuildZoom.org Website: http://www.BuildZoom.org/index.php     Utility payment assistance  5  [facility] Distance: 2.08 miles      COVID-19 Status: Phone/Virtual   [address] [place] [address]  Language: English  Hours: Mon - Fri 9:00 AM  - 4:00 PM  Fees: Free   Phone: (841) 497-9812 Website: http://Sharp Mesa Vista.org/Community Health/[facility]-ave/     6  [facility] ([facility]) [facility] - Supportive Housing Assistance Program ([facility]) Distance: 2.4 miles      COVID-19 Status: Phone/Virtual   [address] [place] [address]  Language: English, Hmong, Nayeli, Sri Lankan  Hours: Mon - Fri 8:30 AM - 5:00 PM  Fees: Free   Phone: (805) 469-5420 Email: kyle@ong.org Website: http://www.ong.org/[facility]-impact-areas/          Food and Nutrition       Food pantry  7  [facility] [address] Food Shelf Distance: 0.41 miles      COVID-19 Status: Delivery, COVID-19 Status: Pickup   [address] [address]  Language: English  Hours: Mon - Fri 10:00 AM - 12:30 PM , Mon - Fri 2:00 PM - 4:30 PM  Fees: Free   Phone: (591) 415-8520 Website: http://Atempo/food-shelves/     8  MORE Distance: 1.01 miles      COVID-19 Status: Pickup   [address] [place] [address]  Language: English, Nayeli, Azeri, Italian, French  Hours: Fri 9:30 AM - 12:00 PM Appt. Only  Fees: Free   Phone: (365) 841-3674 Email: info@more-empowerment.org Website: http://www.more-empowerment.org/          Mental Health       Individual counseling  9  [facility] Distance: 1.03 miles      COVID-19 Status: Phone/Virtual   [address] [place] [address]  Language: English  Hours: Mon - Fri 8:00 AM - 5:00 PM  Fees: Insurance, Self Pay   Phone: (417) 273-3942 Email: aron@CGA Endowment     10  People[employer]. Distance: 1.81 miles      COVID-19 Status: Phone/Virtual   [address] [place] [address]  Language: English, Hmong, Sri Lankan  Hours: Mon - Fri 9:00 AM - 5:00 PM  Fees: Insurance, Self Pay   Phone: (711) 644-6950 Email: info@5minutes.SoundSenasation Website: http://www.BeehiveIDes.org/     Mental health support group  11  [facility] Distance:  2.63 miles      COVID-19 Status: Phone/Virtual   [address] [place] [address]  Language: English  Hours: Mon - Fri 8:00 AM - 4:30 PM  Fees: Free   Phone: (991) 188-5276 Website: https://www.va.gov/find-locations/facility/vc_0416V     12  Emotions Anonymous International - Emotions Anonymous Distance: 3.2 miles      COVID-19 Status: Regular Operations, COVID-19 Status: Phone/Virtual   [address] [place] [address]  Language: English  Hours: Mon - Thu 12:00 PM - 5:00 PM  Fees: Free   Phone: (490) 313-1958 Email: info@Bloodhound.org Website: http://Bloodhound.org/          Important Numbers & Websites       Emergency Services   911  City Services   311  Poison Control   (898) 570-6145  Suicide Prevention Lifeline   (452) 558-5070 (TALK)  Child Abuse Hotline   (791) 320-5261 (4-A-Child)  Sexual Assault Hotline   (225) 264-3814 (HOPE)  National Runaway Safeline   (498) 209-1846 (RUNAWAY)  All-Options Talkline   (143) 254-4302  Substance Abuse Referral   (649) 472-7284 (HELP)

## 2022-08-12 NOTE — COMMUNITY RESOURCES LIST (ENGLISH)
08/12/2022   [facility]  N/A  For questions about this resource list or additional care needs, please contact your primary care clinic or care manager.  Phone: [phone]   Email: N/A   Address: [address] [place] [address]   Hours: N/A        Financial Stability       Rent and mortgage payment assistance  1  [facility] Distance: 2.08 miles      COVID-19 Status: Phone/Virtual   [address] [place] [address]  Language: English  Hours: Mon - Fri 9:00 AM - 4:00 PM  Fees: Free   Phone: (621) 813-4801 Website: http://[facility]/     2  [place] Assistance [place] for Veterans ([facility]) - [facility] Distance: 2.31 miles      COVID-19 Status: Phone/Virtual   [address] [address]  Language: English  Hours: Mon - Fri 8:00 AM - 4:00 PM  Fees: Free   Phone: (806) 685-3036 Email: [employer] Holdings-People Capital@Scriptick Website: http://www.Ranovus.org/index.php          Important Numbers & Websites       Emergency Services   911  City Services   311  Poison Control   (476) 276-9948  Suicide Prevention Lifeline   (274) 786-1892 (TALK)  Child Abuse Hotline   (332) 230-2780 (4-A-Child)  Sexual Assault Hotline   (712) 753-8413 (HOPE)  National Runaway Safeline   (922) 381-5094 (RUNAWAY)  All-Options Talkline   (968) 557-7388  Substance Abuse Referral   (473) 132-1964 (HELP)

## 2022-08-12 NOTE — COMMUNITY RESOURCES LIST (ENGLISH)
08/12/2022   [facility]  N/A  For questions about this resource list or additional care needs, please contact your primary care clinic or care manager.  Phone: [phone]   Email: N/A   Address: [address] [place] [address]   Hours: N/A        Financial Stability       Health insurance application assistance  1  [facility] Distance: 1.01 miles      COVID-19 Status: Phone/Virtual   [address] [place] [address]  Language: English  Hours: Mon 8:00 AM - 5:00 PM , Wed 8:00 AM - 5:00 PM  Fees: Free   Phone: (407) 128-7391 Email: info@Dr Sears Family Essentialshealth.org Website: http://Blowtorch.org     2  [facility] Distance: 1.81 miles      COVID-19 Status: Regular Operations, COVID-19 Status: Phone/Virtual   [address] [place] [address]  Language: English, Nayeli, Nauruan  Hours: Mon - Fri 8:00 AM - 4:00 PM  Fees: Free   Phone: (958) 962-4310 Email: info@Qloon.org Website: http://www.Bee Theren.org/     Rent and mortgage payment assistance  3  [facility] Distance: 2.08 miles      COVID-19 Status: Phone/Virtual   [address] [place] [address]  Language: English  Hours: Mon - Fri 9:00 AM - 4:00 PM  Fees: Free   Phone: (212) 690-7711 Website: http://[facility].org/[facility]/     4  [place] Assistance [place] for Veterans ([facility]) [facility] Distance: 2.31 miles      COVID-19 Status: Phone/Virtual   1000 [facility] [address] [place] [address]  Language: English  Hours: Mon - Fri 8:00 AM - 4:00 PM  Fees: Free   Phone: (326) 357-4820 Email: mac-v@LAN-Power.org Website: http://www.LAN-Power.org/index.php     Utility payment assistance  5  [facility] Distance: 2.08 miles      COVID-19 Status: Phone/Virtual   [address] [place] [address]  Language: English  Hours: Mon - Fri 9:00 AM  - 4:00 PM  Fees: Free   Phone: (248) 243-6730 Website: http://[facility].org/[facility]/mh-ekmw-vjwvr-ave/     6  [facility] ([facility]) [facility] - Supportive Housing Assistance Program (SHAP) Distance: 2.4 miles      COVID-19 Status: Phone/Virtual   [address] [place] [address]  Language: English, Hmong, Nayeli, Filipino  Hours: Mon - Fri 8:30 AM - 5:00 PM  Fees: Free   Phone: (523) 240-6093 Email: kyle@SUPENTA.org Website: http://www.ong.org/hap-impact-areas/          Food and Nutrition       Food pantry  7  [facility] [address] Food [address]f Distance: 0.41 miles      COVID-19 Status: Delivery, COVID-19 Status: Pickup   1459 [facility] [address] [place] [address]  Language: English  Hours: Mon - Fri 10:00 AM - 12:30 PM , Mon - Fri 2:00 PM - 4:30 PM  Fees: Free   Phone: (457) 634-7265 Website: http://madvertise/food-shelves/     8  MORE Distance: 1.01 miles      COVID-19 Status: Pickup   [address] [place] [address]  Language: English, Nayeli, Kinyarwanda, Estonian, [place] citizen  Hours: Fri 9:30 AM - 12:00 PM Appt. Only  Fees: Free   Phone: (221) 850-2313 Email: info@more-empowerment.org Website: http://www.more-empowerment.org/          Important Numbers & Websites       Emergency Services   911  City Services   311  Poison Control   (826) 553-5925  Suicide Prevention Lifeline   (603) 820-7913 (TALK)  Child Abuse Hotline   (909) 452-8220 (4-A-Child)  Sexual Assault Hotline   (943) 830-3735 (HOPE)  National Runaway Safeline   (460) 450-5965 (RUNAWAY)  All-Options Talkline   (628) 385-3477  Substance Abuse Referral   (668) 130-6683 (HELP)

## 2022-08-14 PROBLEM — F43.10 PTSD (POST-TRAUMATIC STRESS DISORDER): Status: ACTIVE | Noted: 2022-08-14

## 2022-08-14 PROBLEM — F31.4 BIPOLAR DISORDER, CURRENT EPISODE DEPRESSED, SEVERE, WITHOUT PSYCHOTIC FEATURES (H): Status: ACTIVE | Noted: 2022-08-14

## 2022-08-14 PROBLEM — F10.20 ALCOHOL DEPENDENCE, UNCOMPLICATED (H): Status: RESOLVED | Noted: 2019-04-09 | Resolved: 2022-08-14

## 2022-08-14 PROBLEM — F43.89 REACTION TO CHRONIC STRESS: Status: ACTIVE | Noted: 2022-08-14

## 2022-08-14 PROBLEM — F51.5 NIGHTMARE: Status: ACTIVE | Noted: 2022-08-14

## 2022-08-17 ENCOUNTER — MYC MEDICAL ADVICE (OUTPATIENT)
Dept: FAMILY MEDICINE | Facility: CLINIC | Age: 47
End: 2022-08-17

## 2022-08-18 ENCOUNTER — VIRTUAL VISIT (OUTPATIENT)
Dept: FAMILY MEDICINE | Facility: CLINIC | Age: 47
End: 2022-08-18
Payer: COMMERCIAL

## 2022-08-18 DIAGNOSIS — F43.10 PTSD (POST-TRAUMATIC STRESS DISORDER): ICD-10-CM

## 2022-08-18 DIAGNOSIS — F31.4 BIPOLAR DISORDER, CURRENT EPISODE DEPRESSED, SEVERE, WITHOUT PSYCHOTIC FEATURES (H): Primary | ICD-10-CM

## 2022-08-18 DIAGNOSIS — F51.5 NIGHTMARE: ICD-10-CM

## 2022-08-18 PROCEDURE — 99214 OFFICE O/P EST MOD 30 MIN: CPT | Mod: GT | Performed by: FAMILY MEDICINE

## 2022-08-18 RX ORDER — QUETIAPINE FUMARATE 100 MG/1
TABLET, FILM COATED ORAL
Qty: 60 TABLET | Refills: 0 | Status: SHIPPED | OUTPATIENT
Start: 2022-08-18 | End: 2022-09-06

## 2022-08-18 RX ORDER — PRAZOSIN HYDROCHLORIDE 1 MG/1
1 CAPSULE ORAL AT BEDTIME
Qty: 90 CAPSULE | Refills: 1 | Status: SHIPPED | OUTPATIENT
Start: 2022-08-18 | End: 2022-09-06

## 2022-08-18 ASSESSMENT — ANXIETY QUESTIONNAIRES
6. BECOMING EASILY ANNOYED OR IRRITABLE: SEVERAL DAYS
7. FEELING AFRAID AS IF SOMETHING AWFUL MIGHT HAPPEN: MORE THAN HALF THE DAYS
8. IF YOU CHECKED OFF ANY PROBLEMS, HOW DIFFICULT HAVE THESE MADE IT FOR YOU TO DO YOUR WORK, TAKE CARE OF THINGS AT HOME, OR GET ALONG WITH OTHER PEOPLE?: SOMEWHAT DIFFICULT
3. WORRYING TOO MUCH ABOUT DIFFERENT THINGS: MORE THAN HALF THE DAYS
4. TROUBLE RELAXING: MORE THAN HALF THE DAYS
3. WORRYING TOO MUCH ABOUT DIFFERENT THINGS: MORE THAN HALF THE DAYS
GAD7 TOTAL SCORE: 10
5. BEING SO RESTLESS THAT IT IS HARD TO SIT STILL: NOT AT ALL
8. IF YOU CHECKED OFF ANY PROBLEMS, HOW DIFFICULT HAVE THESE MADE IT FOR YOU TO DO YOUR WORK, TAKE CARE OF THINGS AT HOME, OR GET ALONG WITH OTHER PEOPLE?: SOMEWHAT DIFFICULT
GAD7 TOTAL SCORE: 10
GAD7 TOTAL SCORE: 10
7. FEELING AFRAID AS IF SOMETHING AWFUL MIGHT HAPPEN: MORE THAN HALF THE DAYS
5. BEING SO RESTLESS THAT IT IS HARD TO SIT STILL: NOT AT ALL
2. NOT BEING ABLE TO STOP OR CONTROL WORRYING: MORE THAN HALF THE DAYS
1. FEELING NERVOUS, ANXIOUS, OR ON EDGE: SEVERAL DAYS
IF YOU CHECKED OFF ANY PROBLEMS ON THIS QUESTIONNAIRE, HOW DIFFICULT HAVE THESE PROBLEMS MADE IT FOR YOU TO DO YOUR WORK, TAKE CARE OF THINGS AT HOME, OR GET ALONG WITH OTHER PEOPLE: SOMEWHAT DIFFICULT
GAD7 TOTAL SCORE: 10
GAD7 TOTAL SCORE: 10
IF YOU CHECKED OFF ANY PROBLEMS ON THIS QUESTIONNAIRE, HOW DIFFICULT HAVE THESE PROBLEMS MADE IT FOR YOU TO DO YOUR WORK, TAKE CARE OF THINGS AT HOME, OR GET ALONG WITH OTHER PEOPLE: SOMEWHAT DIFFICULT
7. FEELING AFRAID AS IF SOMETHING AWFUL MIGHT HAPPEN: MORE THAN HALF THE DAYS
7. FEELING AFRAID AS IF SOMETHING AWFUL MIGHT HAPPEN: MORE THAN HALF THE DAYS
4. TROUBLE RELAXING: MORE THAN HALF THE DAYS
6. BECOMING EASILY ANNOYED OR IRRITABLE: SEVERAL DAYS
2. NOT BEING ABLE TO STOP OR CONTROL WORRYING: MORE THAN HALF THE DAYS
GAD7 TOTAL SCORE: 10
1. FEELING NERVOUS, ANXIOUS, OR ON EDGE: SEVERAL DAYS

## 2022-08-18 ASSESSMENT — PATIENT HEALTH QUESTIONNAIRE - PHQ9
SUM OF ALL RESPONSES TO PHQ QUESTIONS 1-9: 11
10. IF YOU CHECKED OFF ANY PROBLEMS, HOW DIFFICULT HAVE THESE PROBLEMS MADE IT FOR YOU TO DO YOUR WORK, TAKE CARE OF THINGS AT HOME, OR GET ALONG WITH OTHER PEOPLE: SOMEWHAT DIFFICULT
10. IF YOU CHECKED OFF ANY PROBLEMS, HOW DIFFICULT HAVE THESE PROBLEMS MADE IT FOR YOU TO DO YOUR WORK, TAKE CARE OF THINGS AT HOME, OR GET ALONG WITH OTHER PEOPLE: SOMEWHAT DIFFICULT

## 2022-08-18 NOTE — PROGRESS NOTES
Mariola is a 46 year old who is being evaluated via a billable video visit.      How would you like to obtain your AVS? MyChart  If the video visit is dropped, the invitation should be resent by: Text to cell phone: [phone]  Will anyone else be joining your video visit? No      ____________________________    Virtual Visit - Video Encounter  [facility]  Family Medicine  Date of Service: 8/18/2022    Subjective:  Chief Complaint   Patient presents with     RECHECK      More paranoia and anxiety  Worse at work. 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 Intermittent leave. 2 days per week.     Sleep: Bed [place]-1:20, up at 5:20 AM. Tosses and turns. No nap.  No nightmares - huge improvement.     Anxiety  Worse around people.     Having twins is a huge transition. One has autoimmune disease, one with bipolar. Personality clash.     Mental Health Follow-up:  Patient presents to follow-up on Anxiety.    Patient's anxiety since last visit has been:  Medium  The patient is having other symptoms associated with anxiety.  Any significant life events: job concerns and other  Patient is feeling anxious or having panic attacks.  Patient has no concerns about alcohol or drug use.    She eats 0-1 servings of fruits and vegetables daily.She consumes 1 sweetened beverage(s) daily.She exercises with enough effort to increase her heart rate 9 or less minutes per day.  She exercises with enough effort to increase her heart rate 5 days per week.   She is taking medications regularly.    Today's PHQ-9         PHQ-9 Total Score: 11    PHQ-9 Q9 Thoughts of better off dead/self-harm past 2 weeks :   Not at all    How difficult have these problems made it for you to do your work, take care of things at home, or get along with other people: Somewhat difficult  Today's LUCIAN-7 Score: 10    Objective:            GENERAL: Healthy, alert and no distress  EYES: Eyes grossly normal to inspection.  No discharge  or erythema, or obvious scleral/conjunctival abnormalities.  RESP: No audible wheeze, cough, or visible cyanosis.  No visible retractions or increased work of breathing.    SKIN: Visible skin clear. No significant rash, abnormal pigmentation or lesions.  NEURO: Cranial nerves grossly intact.  Mentation and speech appropriate for age.  PSYCH: Mentation appears normal, affect normal/bright, judgement and insight intact, normal speech and appearance well-groomed.   No visits with results within 1 Week(s) from this visit.   Latest known visit with results is:   Office Visit on 10/08/2021   Component Date Value Ref Range Status     Group A Strep antigen 10/08/2021 Negative  Negative Final     SARS CoV2 PCR 10/08/2021 Negative  Negative Final    NEGATIVE: SARS-CoV-2 (COVID-19) RNA not detected, presumed negative.     Group A strep by PCR 10/08/2021 Not Detected  Not Detected Final     No results found.   Assessment & Plan:  1. Bipolar affective disorder. Seroquel not helping much. Increase dose by titraiting up to 200 mg for three days, then 300 mg every night. Recheck in one week. Psychiatry appointment ordered. Prazosin working well for PTSD/nightmares.       Order Summary                                                      Mariola was seen today for recheck.    Diagnoses and all orders for this visit:    Cervical cancer screening    Bipolar disorder, current episode depressed, severe, without psychotic features (H)  -     QUEtiapine (SEROQUEL) 100 MG tablet; Take 2 tablets (200 mg) by mouth At Bedtime for 3 days, THEN 3 tablets (300 mg) At Bedtime for 30 days.    PTSD (post-traumatic stress disorder)  -     prazosin (MINIPRESS) 1 MG capsule; Take 1 capsule (1 mg) by mouth At Bedtime    Nightmare  -     prazosin (MINIPRESS) 1 MG capsule; Take 1 capsule (1 mg) by mouth At Bedtime        Future Appointments   Date Time Provider Department Center   8/25/2022 12:40 PM [facility]   9/29/2022 12:20 PM  Gisella Licea MD [facility]   10/11/2022  7:30 AM Debora Oseguera NP [facility] [place]       Completed by: Gisella Licea M.D., [facility]. 8/18/2022 12:44 PM.  This transcription uses voice recognition software, which may contain typographical errors.  ____________________________  Start visit: 12:44 PM  End visit: 1:00 PM      Video-Visit Details    Type of service:  Video Visit    Originating Location (pt. Location): Home    Distant Location (provider location):  [facility]     Platform used for Video Visit: LivBlends

## 2022-08-19 NOTE — TELEPHONE ENCOUNTER
Forms/Letter Request    Type of form/letter: FMLA - Unknown    Have you been seen for this request: N/A    Do we have the form/letter: Yes: It was sent via Fro[employer].     Patient called to check on the status of this request. The forms are due on 08/25/2022.  Please fill out forms and fax to patient HR.    When is form/letter needed by: 08/25/2022    How would you like the form/letter returned: Fax to number on form    Patient Notified form requests are processed in 3-5 business days:No    Could we send this information to you in Evoz or would you prefer to receive a phone call?:   No preference   Okay to leave a detailed message?: Yes at Home number on file [phone] (home)

## 2022-08-25 ENCOUNTER — VIRTUAL VISIT (OUTPATIENT)
Dept: FAMILY MEDICINE | Facility: CLINIC | Age: 47
End: 2022-08-25
Payer: COMMERCIAL

## 2022-08-25 DIAGNOSIS — Z53.9 ERRONEOUS ENCOUNTER--DISREGARD: Primary | ICD-10-CM

## 2022-08-25 DIAGNOSIS — Z12.4 CERVICAL CANCER SCREENING: ICD-10-CM

## 2022-08-25 NOTE — PROGRESS NOTES
Video visit was attempted.  I could tell that she was on the video because there was movement on the video and I could hear people talking distantly.  But we were unable to connect with her.  We tried calling a few times, without success.  Please try to reschedule this appointment.  This encounter was opened in error. Please disregard.  Answers for HPI/ROS submitted by the patient on 8/18/2022  If you checked off any problems, how difficult have these problems made it for you to do your work, take care of things at home, or get along with other people?: Somewhat difficult  PHQ9 TOTAL SCORE: 11  LUCIAN 7 TOTAL SCORE: 10  Depression/Anxiety: Anxiety  Anxiety since last: : medium  Other associated symotome: : Yes  Significant life event: : job concerns, other  Anxious:: Yes  Current substance use:: No  How many servings of fruits and vegetables do you eat daily?: 0-1  On average, how many sweetened beverages do you drink each day (Examples: soda, juice, sweet tea, etc.  Do NOT count diet or artificially sweetened beverages)?: 1  How many minutes a day do you exercise enough to make your heart beat faster?: 9 or less  How many days a week do you exercise enough to make your heart beat faster?: 5  How many days per week do you miss taking your medication?: 0

## 2022-09-04 DIAGNOSIS — F31.4 BIPOLAR DISORDER, CURRENT EPISODE DEPRESSED, SEVERE, WITHOUT PSYCHOTIC FEATURES (H): ICD-10-CM

## 2022-09-04 DIAGNOSIS — F51.5 NIGHTMARE: ICD-10-CM

## 2022-09-04 DIAGNOSIS — F43.10 PTSD (POST-TRAUMATIC STRESS DISORDER): ICD-10-CM

## 2022-09-04 NOTE — TELEPHONE ENCOUNTER
"Routing refill request to provider for review/approval because:  Labs not current:  many    Last Written Prescription Date:  8/18/22  Last Fill Quantity: 60,  # refills: 0   Last office visit provider:  8/18/22     Requested Prescriptions   Pending Prescriptions Disp Refills     QUEtiapine (SEROQUEL) 100 MG tablet [Pharmacy Med Name: QUETIAPINE 100MG TABLETS] 30 tablet      Sig: TAKE 1 TABLET(100 MG) BY MOUTH AT BEDTIME       Antipsychotic Medications Failed - 9/4/2022  3:55 AM        Failed - Lipid panel on file within the past 12 months     No lab results found.            Failed - CBC on file in past 12 months     No lab results found.              Failed - A1c or Glucose on file in past 12 months     Recent Labs   Lab Test 04/09/19  1451   GLC 87   A1C 5.5       Please review patients last 3 weights. If a weight gain of >10 lbs exists, you may refill the prescription once after instructing the patient to schedule an appointment within the next 30 days.    Wt Readings from Last 3 Encounters:   10/08/21 86.2 kg (190 lb)   09/10/21 86.2 kg (190 lb)   05/02/19 75.8 kg (167 lb)             Passed - Blood pressure under 140/90 in past 12 months     BP Readings from Last 3 Encounters:   10/08/21 138/87   09/10/21 127/81   05/02/19 110/63                 Passed - Patient is 12 years of age or older        Passed - Heart Rate on file within past 12 months     Pulse Readings from Last 3 Encounters:   10/08/21 80   09/10/21 89   05/02/19 70               Passed - Medication is active on med list        Passed - Patient is not pregnant        Passed - No positve pregnancy test on file in past 12 months        Passed - Recent (6 mo) or future (30 days) visit within the authorizing provider's specialty     Patient had office visit in the last 6 months or has a visit in the next 30 days with authorizing provider or within the authorizing provider's specialty.  See \"Patient Info\" tab in inbasket, or \"Choose Columns\" in Meds & " "Orders section of the refill encounter.               prazosin (MINIPRESS) 1 MG capsule [Pharmacy Med Name: PRAZOSIN 1MG CAPSULES] 30 capsule      Sig: TAKE 1 CAPSULE(1 MG) BY MOUTH AT BEDTIME       Antiadrenergic Antihypertensives Failed - 9/4/2022  3:55 AM        Failed - Blood pressure less than 140/90 in past 6 months     BP Readings from Last 3 Encounters:   10/08/21 138/87   09/10/21 127/81   05/02/19 110/63                 Failed - Normal serum creatinine on file in past 12 months     Recent Labs   Lab Test 04/09/19  1451   CR 0.51*       Ok to refill medication if creatinine is low          Passed - Medication is active on med list        Passed - Patient is age 18 or older        Passed - No active pregnancy on record        Passed - No positive pregnancy test within past 12 months        Passed - Recent (6 mo) or future (30 days) visit within the authorizing provider's specialty     Patient had office visit in the last 6 months or has a visit in the next 30 days with authorizing provider or within the authorizing provider's specialty.  See \"Patient Info\" tab in inbasket, or \"Choose Columns\" in Meds & Orders section of the refill encounter.           Alpha Blockers Passed - 9/4/2022  3:55 AM        Passed - Blood pressure under 140/90 in past 12 months     BP Readings from Last 3 Encounters:   10/08/21 138/87   09/10/21 127/81   05/02/19 110/63                 Passed - Recent (12 mo) or future (30 days) visit within the authorizing provider's specialty     Patient has had an office visit with the authorizing provider or a provider within the authorizing providers department within the previous 12 mos or has a future within next 30 days. See \"Patient Info\" tab in inbasket, or \"Choose Columns\" in Meds & Orders section of the refill encounter.              Passed - Patient does not have Tadalafil, Vardenafil, or Sildenafil on their medication list        Passed - Medication is active on med list        Passed " - Patient is 18 years of age or older        Passed - No active pregnancy on record        Passed - No positive pregnancy test in past 12 months             Aurea Adames, RN 09/04/22 5:24 PM

## 2022-09-06 ENCOUNTER — PATIENT OUTREACH (OUTPATIENT)
Dept: CARE COORDINATION | Facility: CLINIC | Age: 47
End: 2022-09-06

## 2022-09-06 RX ORDER — PRAZOSIN HYDROCHLORIDE 1 MG/1
CAPSULE ORAL
Qty: 90 CAPSULE | Refills: 0 | Status: SHIPPED | OUTPATIENT
Start: 2022-09-06 | End: 2022-10-24

## 2022-09-06 RX ORDER — QUETIAPINE FUMARATE 100 MG/1
TABLET, FILM COATED ORAL
Qty: 90 TABLET | Refills: 0 | Status: SHIPPED | OUTPATIENT
Start: 2022-09-06 | End: 2022-10-24

## 2022-09-06 ASSESSMENT — ACTIVITIES OF DAILY LIVING (ADL): DEPENDENT_IADLS:: INDEPENDENT

## 2022-09-06 NOTE — PROGRESS NOTES
Clinic Care Coordination Contact  Clinic Care Coordination - Chart Review Only    Situation/Background: Patient chart reviewed by care coordinator related to [facility] conversion.    Assessment: Patient continues to be followed by Clinic Care Coordination.    Plan: Patient's chart updated to align with [facility] program for ongoing patient management.      SHANNA Patricia / SHANNA Estrada  [facility] Primary Care   Care Coordination  [facility]  9/6/2022 9:50 AM

## 2022-09-06 NOTE — LETTER
[facility] CARE COORDINATION  [address] [address]    October 14, 2022        Mariola Pardo  [address]  [place] [address]          Dear Mariola,     Attached is an updated Patient Centered Plan of Care for your continued enrollment in Care Coordination. Please let us know if you have additional questions, concerns, or goals that we can assist with.    Sincerely,    [facility] Care Coordination                                                [facility]  Patient Centered Plan of Care  About Me:        Patient Name:  Mariola Pardo    YOB: 1975  Age:         47 year old   May MRN:    [id number] Telephone Information:  Home Phone [phone]   Mobile [phone]   Home Phone [phone]       Address:  [address] [place] [address] Email address:  [email or web address]      Emergency Contact(s)    Name Relationship Lgl Grd Work Phone Home Phone Mobile Phone   1. MARK WELSH* Daughter   [phone]            Primary language:  English     needed? No   [employer]:  [phone] op. 1  Other communication barriers:None    Preferred Method of Communication:  Mail  Current living arrangement: I live in a private home with family    Mobility Status/ Medical Equipment: Independent        Health Maintenance  Health Maintenance Reviewed:   Health Maintenance Due   Topic Date Due     MAMMO SCREENING  Never done     Pneumococcal Vaccine: Pediatrics (0 to 5 Years) and At-Risk Patients (6 to 64 Years) (1 - PCV) Never done     COLORECTAL CANCER SCREENING  Never done     HEPATITIS B IMMUNIZATION (3 of 3 - 3-dose series) 08/03/2015     COVID-19 Vaccine (3 - Booster for Pfizer series) 11/04/2021     INFLUENZA VACCINE (1) Never done         My Access Plan  Medical Emergency 911   Primary Clinic Line   - [phone]   24 Hour Appointment Line [phone] or  [phone]) (toll-free)   24 Hour  Nurse Line [phone] (toll-free)   Preferred Urgent Care [facility], [phone]     Preferred [facility]  [phone]     Preferred Pharmacy [id number] - [place], [place] - [address]     Behavioral Health Crisis Line The National Suicide Prevention Lifeline at 1-310.923.6838 or Text/Call 988             My Care Team Members  Patient Care Team       Relationship Specialty Notifications Start End    Gisella Licea MD PCP - General Family Medicine  8/4/22     Phone: [phone] Fax: [phone]         [address] [address]    Connie Camarena, LSW Lead Care Coordinator  Admissions 8/12/22     Gisella Licea MD Assigned PCP   8/20/22     Phone: [phone] Fax: [phone]         [address] [address]            My Care Plans  Self Management and Treatment Plan  Care Plan  Care Plan: Food Insecurity     Problem: Reliable food source     Goal: Establish Options for Affordable Food Sources     Start Date: 8/12/2022 Expected End Date: 2/12/2023    Note:     Goal Statement: I want to go to food [place] near me to increase food security  Date Goal set: 8/12/2022  Barriers: Social support  Strengths: Children  Date to Achieve By: 2/12/2023  Patient expressed understanding of goal: Yes  Action steps to achieve this goal:  1. I will look into the food [place] found by  SW and begin getting food support  2. I will connect with [facility] team monthly to assess goal progress and discuss any other concerns or questions                     Care Plan: Mental Health     Problem: Mood/Psychosocial Concerns     Goal: Establish Mental Health Support     Start Date: 8/12/2022 Expected End Date: 2/12/2023    Note:     Goal Statement: I want to find holistic mental health support for myself  Date Goal set: 8/12/2022  Barriers: Social Support  Strengths: Children  Date to Achieve By: 2/12/2023  Patient expressed  understanding of goal: Yes  Action steps to achieve this goal:  1. I will look into the resources sent to me by  PIOT[facility] regarding mental health support   2. I will reach out, schedule, and participate in the resources that I find and want to become involved in  3. I will connect with [facility] team monthly to assess goals                                 My Medical and Care Information  Problem List   Patient Active Problem List   Diagnosis     High grade squamous intraepithelial lesion (HGSIL), grade 3 MERLYN, on biopsy of cervix     Reaction to chronic stress     PTSD (post-traumatic stress disorder)     Bipolar disorder, current episode depressed, severe, without psychotic features (H)     Alcohol dependence in remission (H)     Generalized anxiety disorder      Current Medications and Allergies:      Allergies   Allergen Reactions     Codeine Hives, Other (See Comments) and Swelling     Difficulty breathing  Difficulty breathing       No Clinical Screening - See Comments Difficulty breathing, Other (See Comments), Hives and Swelling     Other reaction(s): Hives     Current Outpatient Medications   Medication     prazosin (MINIPRESS) 1 MG capsule     prazosin (MINIPRESS) 1 MG capsule     QUEtiapine (SEROQUEL) 100 MG tablet     sertraline (ZOLOFT) 100 MG tablet     No current facility-administered medications for this visit.         Care Coordination Start Date: 8/12/2022   Frequency of Care Coordination: monthly     Form Last Updated: 10/14/2022

## 2022-09-06 NOTE — TELEPHONE ENCOUNTER
Due for lab visit and BP check. Please schedule.     Future Appointments   Date Time Provider Department Center   9/29/2022 12:20 PM [facility]   10/11/2022  7:30 AM Debora Oseguera, JAYSON [facility]   10/24/2022 10:40 AM Gisella Licea MD [facility]      Health Maintenance Due   Topic Date Due     ADVANCE CARE PLANNING  Never done     Pneumococcal Vaccine: Pediatrics (0 to 5 Years) and At-Risk Patients (6 to 64 Years) (1 - PCV) Never done     DTAP/TDAP/TD IMMUNIZATION (7 - Td or Tdap) 01/17/2018     HPV TEST  11/02/2019     PAP  11/02/2019     NICOTINE/TOBACCO CESSATION COUNSELING Q 1 YR  04/09/2020     PREVENTIVE CARE VISIT  04/09/2020     COVID-19 Vaccine (3 - Booster for Pfizer series) 02/09/2022     INFLUENZA VACCINE (1) Never done     BP Readings from Last 3 Encounters:   10/08/21 138/87   09/10/21 127/81   05/02/19 110/63     Mariola was seen today for medication refill.    Diagnoses and all orders for this visit:    Bipolar disorder, current episode depressed, severe, without psychotic features (H)  -     QUEtiapine (SEROQUEL) 100 MG tablet; TAKE 1 TABLET(100 MG) BY MOUTH AT BEDTIME  -     CBC with platelets; Future  -     Hemoglobin A1c; Future  -     Lipid panel reflex to direct LDL Fasting; Future  -     TSH with free T4 reflex; Future  -     CBC with Platelets & Differential; Future    PTSD (post-traumatic stress disorder)  -     prazosin (MINIPRESS) 1 MG capsule; TAKE 1 CAPSULE(1 MG) BY MOUTH AT BEDTIME    Nightmare  -     prazosin (MINIPRESS) 1 MG capsule; TAKE 1 CAPSULE(1 MG) BY MOUTH AT BEDTIME

## 2022-09-29 ENCOUNTER — OFFICE VISIT (OUTPATIENT)
Dept: FAMILY MEDICINE | Facility: CLINIC | Age: 47
End: 2022-09-29
Payer: COMMERCIAL

## 2022-09-29 VITALS
OXYGEN SATURATION: 97 % | BODY MASS INDEX: 31.76 KG/M2 | WEIGHT: 186 LBS | SYSTOLIC BLOOD PRESSURE: 118 MMHG | DIASTOLIC BLOOD PRESSURE: 68 MMHG | HEIGHT: 64 IN | TEMPERATURE: 97.7 F | HEART RATE: 66 BPM

## 2022-09-29 DIAGNOSIS — F10.21 ALCOHOL DEPENDENCE IN REMISSION (H): ICD-10-CM

## 2022-09-29 DIAGNOSIS — Z12.31 VISIT FOR SCREENING MAMMOGRAM: ICD-10-CM

## 2022-09-29 DIAGNOSIS — Z13.6 SCREENING FOR CARDIOVASCULAR CONDITION: ICD-10-CM

## 2022-09-29 DIAGNOSIS — Z86.32 HISTORY OF GESTATIONAL DIABETES: ICD-10-CM

## 2022-09-29 DIAGNOSIS — F31.4 BIPOLAR DISORDER, CURRENT EPISODE DEPRESSED, SEVERE, WITHOUT PSYCHOTIC FEATURES (H): ICD-10-CM

## 2022-09-29 DIAGNOSIS — Z12.11 SCREEN FOR COLON CANCER: ICD-10-CM

## 2022-09-29 DIAGNOSIS — Z00.00 ROUTINE GENERAL MEDICAL EXAMINATION AT A HEALTH CARE FACILITY: Primary | ICD-10-CM

## 2022-09-29 DIAGNOSIS — Z12.4 CERVICAL CANCER SCREENING: ICD-10-CM

## 2022-09-29 PROBLEM — F51.5 NIGHTMARE: Status: RESOLVED | Noted: 2022-08-14 | Resolved: 2022-09-29

## 2022-09-29 LAB
BASOPHILS # BLD AUTO: 0 10E3/UL (ref 0–0.2)
BASOPHILS NFR BLD AUTO: 0 %
CHOLEST SERPL-MCNC: 169 MG/DL
EOSINOPHIL # BLD AUTO: 0.1 10E3/UL (ref 0–0.7)
EOSINOPHIL NFR BLD AUTO: 1 %
ERYTHROCYTE [DISTWIDTH] IN BLOOD BY AUTOMATED COUNT: 13.4 % (ref 10–15)
HCT VFR BLD AUTO: 36.6 % (ref 35–47)
HDLC SERPL-MCNC: 48 MG/DL
HGB BLD-MCNC: 11.8 G/DL (ref 11.7–15.7)
IMM GRANULOCYTES # BLD: 0 10E3/UL
IMM GRANULOCYTES NFR BLD: 0 %
LDLC SERPL CALC-MCNC: 108 MG/DL
LYMPHOCYTES # BLD AUTO: 2.2 10E3/UL (ref 0.8–5.3)
LYMPHOCYTES NFR BLD AUTO: 24 %
MCH RBC QN AUTO: 26.4 PG (ref 26.5–33)
MCHC RBC AUTO-ENTMCNC: 32.2 G/DL (ref 31.5–36.5)
MCV RBC AUTO: 82 FL (ref 78–100)
MONOCYTES # BLD AUTO: 0.4 10E3/UL (ref 0–1.3)
MONOCYTES NFR BLD AUTO: 5 %
NEUTROPHILS # BLD AUTO: 6.4 10E3/UL (ref 1.6–8.3)
NEUTROPHILS NFR BLD AUTO: 70 %
NONHDLC SERPL-MCNC: 121 MG/DL
PLATELET # BLD AUTO: 335 10E3/UL (ref 150–450)
RBC # BLD AUTO: 4.47 10E6/UL (ref 3.8–5.2)
TRIGL SERPL-MCNC: 67 MG/DL
WBC # BLD AUTO: 9.1 10E3/UL (ref 4–11)

## 2022-09-29 PROCEDURE — 80061 LIPID PANEL: CPT | Performed by: FAMILY MEDICINE

## 2022-09-29 PROCEDURE — 90471 IMMUNIZATION ADMIN: CPT | Performed by: FAMILY MEDICINE

## 2022-09-29 PROCEDURE — 90715 TDAP VACCINE 7 YRS/> IM: CPT | Performed by: FAMILY MEDICINE

## 2022-09-29 PROCEDURE — G0145 SCR C/V CYTO,THINLAYER,RESCR: HCPCS | Performed by: FAMILY MEDICINE

## 2022-09-29 PROCEDURE — 85025 COMPLETE CBC W/AUTO DIFF WBC: CPT | Performed by: FAMILY MEDICINE

## 2022-09-29 PROCEDURE — 99396 PREV VISIT EST AGE 40-64: CPT | Mod: 25 | Performed by: FAMILY MEDICINE

## 2022-09-29 PROCEDURE — 87624 HPV HI-RISK TYP POOLED RSLT: CPT | Performed by: FAMILY MEDICINE

## 2022-09-29 PROCEDURE — 36415 COLL VENOUS BLD VENIPUNCTURE: CPT | Performed by: FAMILY MEDICINE

## 2022-09-29 ASSESSMENT — ENCOUNTER SYMPTOMS
EYE PAIN: 0
JOINT SWELLING: 0
FEVER: 0
HEARTBURN: 1
DYSURIA: 0
BREAST MASS: 0
SHORTNESS OF BREATH: 0
HEMATURIA: 0
SORE THROAT: 1
DIARRHEA: 0
ARTHRALGIAS: 0
CHILLS: 0
NERVOUS/ANXIOUS: 0
FREQUENCY: 0
CONSTIPATION: 0
MYALGIAS: 0
WEAKNESS: 0
HEADACHES: 0
PALPITATIONS: 0
PARESTHESIAS: 0
ABDOMINAL PAIN: 1
HEMATOCHEZIA: 0
DIZZINESS: 0
NAUSEA: 0
COUGH: 0
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
## 2022-10-05 LAB
BKR LAB AP GYN ADEQUACY: NORMAL
BKR LAB AP GYN INTERPRETATION: NORMAL
BKR LAB AP HPV REFLEX: NORMAL
BKR LAB AP PREVIOUS ABNL DX: NORMAL
BKR LAB AP PREVIOUS ABNORMAL: NORMAL
PATH REPORT.COMMENTS IMP SPEC: NORMAL
PATH REPORT.COMMENTS IMP SPEC: NORMAL
PATH REPORT.RELEVANT HX SPEC: NORMAL

## 2022-10-06 NOTE — PROGRESS NOTES
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 Prevention Lifeline: 830.732.6616 (TTY: 723.710.6440). Call anytime for help.  (www.suicidepreventionlifeline.org)  5. National [place] on Mental Illness (www.nikki.org): 236.361.6948 or 963-025-1373.  6. Mental Health Association (www.mentalhealth.org): 585.927.1993 or 056-882-1774.    ADMINISTRATIVE BILLING:    Time spent interviewing patient, reviewing referral documents, obtaining and reviewing outside records, communication with other health specialists, and preparing this report on today's date  Video/Phone Start Time: 0802  Video/Phone End Time: 0836    Signed:   Any Oseguera DNP, [id number]  [facility] ([facility])

## 2022-10-07 ENCOUNTER — PATIENT OUTREACH (OUTPATIENT)
Dept: FAMILY MEDICINE | Facility: CLINIC | Age: 47
End: 2022-10-07

## 2022-10-07 LAB
HUMAN PAPILLOMA VIRUS 16 DNA: NEGATIVE
HUMAN PAPILLOMA VIRUS 18 DNA: NEGATIVE
HUMAN PAPILLOMA VIRUS FINAL DIAGNOSIS: NORMAL
HUMAN PAPILLOMA VIRUS OTHER HR: NEGATIVE

## 2022-10-10 ASSESSMENT — ANXIETY QUESTIONNAIRES
GAD7 TOTAL SCORE: 7
6. BECOMING EASILY ANNOYED OR IRRITABLE: SEVERAL DAYS
3. WORRYING TOO MUCH ABOUT DIFFERENT THINGS: NOT AT ALL
5. BEING SO RESTLESS THAT IT IS HARD TO SIT STILL: NOT AT ALL
4. TROUBLE RELAXING: NOT AT ALL
2. NOT BEING ABLE TO STOP OR CONTROL WORRYING: MORE THAN HALF THE DAYS
6. BECOMING EASILY ANNOYED OR IRRITABLE: SEVERAL DAYS
5. BEING SO RESTLESS THAT IT IS HARD TO SIT STILL: NOT AT ALL
GAD7 TOTAL SCORE: 7
2. NOT BEING ABLE TO STOP OR CONTROL WORRYING: MORE THAN HALF THE DAYS
7. FEELING AFRAID AS IF SOMETHING AWFUL MIGHT HAPPEN: MORE THAN HALF THE DAYS
3. WORRYING TOO MUCH ABOUT DIFFERENT THINGS: NOT AT ALL
1. FEELING NERVOUS, ANXIOUS, OR ON EDGE: MORE THAN HALF THE DAYS
GAD7 TOTAL SCORE: 7
1. FEELING NERVOUS, ANXIOUS, OR ON EDGE: MORE THAN HALF THE DAYS
8. IF YOU CHECKED OFF ANY PROBLEMS, HOW DIFFICULT HAVE THESE MADE IT FOR YOU TO DO YOUR WORK, TAKE CARE OF THINGS AT HOME, OR GET ALONG WITH OTHER PEOPLE?: VERY DIFFICULT
7. FEELING AFRAID AS IF SOMETHING AWFUL MIGHT HAPPEN: MORE THAN HALF THE DAYS
GAD7 TOTAL SCORE: 7
IF YOU CHECKED OFF ANY PROBLEMS ON THIS QUESTIONNAIRE, HOW DIFFICULT HAVE THESE PROBLEMS MADE IT FOR YOU TO DO YOUR WORK, TAKE CARE OF THINGS AT HOME, OR GET ALONG WITH OTHER PEOPLE: VERY DIFFICULT
7. FEELING AFRAID AS IF SOMETHING AWFUL MIGHT HAPPEN: MORE THAN HALF THE DAYS
IF YOU CHECKED OFF ANY PROBLEMS ON THIS QUESTIONNAIRE, HOW DIFFICULT HAVE THESE PROBLEMS MADE IT FOR YOU TO DO YOUR WORK, TAKE CARE OF THINGS AT HOME, OR GET ALONG WITH OTHER PEOPLE: VERY DIFFICULT
8. IF YOU CHECKED OFF ANY PROBLEMS, HOW DIFFICULT HAVE THESE MADE IT FOR YOU TO DO YOUR WORK, TAKE CARE OF THINGS AT HOME, OR GET ALONG WITH OTHER PEOPLE?: VERY DIFFICULT
7. FEELING AFRAID AS IF SOMETHING AWFUL MIGHT HAPPEN: MORE THAN HALF THE DAYS
GAD7 TOTAL SCORE: 7
4. TROUBLE RELAXING: NOT AT ALL
GAD7 TOTAL SCORE: 7

## 2022-10-10 ASSESSMENT — PATIENT HEALTH QUESTIONNAIRE - PHQ9
SUM OF ALL RESPONSES TO PHQ QUESTIONS 1-9: 6
10. IF YOU CHECKED OFF ANY PROBLEMS, HOW DIFFICULT HAVE THESE PROBLEMS MADE IT FOR YOU TO DO YOUR WORK, TAKE CARE OF THINGS AT HOME, OR GET ALONG WITH OTHER PEOPLE: SOMEWHAT DIFFICULT
10. IF YOU CHECKED OFF ANY PROBLEMS, HOW DIFFICULT HAVE THESE PROBLEMS MADE IT FOR YOU TO DO YOUR WORK, TAKE CARE OF THINGS AT HOME, OR GET ALONG WITH OTHER PEOPLE: SOMEWHAT DIFFICULT
SUM OF ALL RESPONSES TO PHQ QUESTIONS 1-9: 6

## 2022-10-11 ENCOUNTER — VIRTUAL VISIT (OUTPATIENT)
Dept: BEHAVIORAL HEALTH | Facility: CLINIC | Age: 47
End: 2022-10-11
Payer: COMMERCIAL

## 2022-10-11 ENCOUNTER — VIRTUAL VISIT (OUTPATIENT)
Dept: PSYCHIATRY | Facility: CLINIC | Age: 47
End: 2022-10-11
Attending: FAMILY MEDICINE
Payer: COMMERCIAL

## 2022-10-11 DIAGNOSIS — F31.4 BIPOLAR DISORDER, CURRENT EPISODE DEPRESSED, SEVERE, WITHOUT PSYCHOTIC FEATURES (H): ICD-10-CM

## 2022-10-11 DIAGNOSIS — F41.1 GENERALIZED ANXIETY DISORDER: Primary | ICD-10-CM

## 2022-10-11 DIAGNOSIS — F43.10 PTSD (POST-TRAUMATIC STRESS DISORDER): ICD-10-CM

## 2022-10-11 PROCEDURE — 90791 PSYCH DIAGNOSTIC EVALUATION: CPT | Mod: GT | Performed by: SOCIAL WORKER

## 2022-10-11 PROCEDURE — 99204 OFFICE O/P NEW MOD 45 MIN: CPT | Mod: GT | Performed by: STUDENT IN AN ORGANIZED HEALTH CARE EDUCATION/TRAINING PROGRAM

## 2022-10-11 RX ORDER — SERTRALINE HYDROCHLORIDE 100 MG/1
TABLET, FILM COATED ORAL
Qty: 34 TABLET | Refills: 0 | Status: SHIPPED | OUTPATIENT
Start: 2022-10-11 | End: 2022-11-21

## 2022-10-11 RX ORDER — PRAZOSIN HYDROCHLORIDE 1 MG/1
CAPSULE ORAL
Qty: 60 CAPSULE | Refills: 0 | Status: SHIPPED | OUTPATIENT
Start: 2022-10-11 | End: 2022-10-24

## 2022-10-11 ASSESSMENT — COLUMBIA-SUICIDE SEVERITY RATING SCALE - C-SSRS
TOTAL  NUMBER OF INTERRUPTED ATTEMPTS LIFETIME: NO
1. IN THE PAST MONTH, HAVE YOU WISHED YOU WERE DEAD OR WISHED YOU COULD GO TO SLEEP AND NOT WAKE UP?: NO
6. HAVE YOU EVER DONE ANYTHING, STARTED TO DO ANYTHING, OR PREPARED TO DO ANYTHING TO END YOUR LIFE?: NO
TOTAL  NUMBER OF ABORTED OR SELF INTERRUPTED ATTEMPTS LIFETIME: NO
2. HAVE YOU ACTUALLY HAD ANY THOUGHTS OF KILLING YOURSELF?: NO
ATTEMPT LIFETIME: NO
1. HAVE YOU WISHED YOU WERE DEAD OR WISHED YOU COULD GO TO SLEEP AND NOT WAKE UP?: YES

## 2022-10-11 NOTE — PROGRESS NOTES
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          Report to child / adult protection services was NA.     2. Patient's identified mental health concerns with a cultural influence will be addressed by Nothing identified at this time.     3. Initial Treatment will focus on:    Anxiety - Patient will report a decrease in her acute anxiety.     4. Resources/Service Plan:    services are not indicated.   Modifications to assist communication are not indicated.   Additional disability accommodations are not indicated.      5. Collaboration:   Collaboration / coordination of treatment will be initiated with the following  support professionals: primary care physician and psychiatry.      6.  Referrals:   The following referral(s) will be initiated: Patient will follow up with the prior referral to [facility] in [facility]. Next Scheduled Appointment: To be determined.     A Release of Information has been obtained for the following: Not applicable.     Emergency Contact was not obtained.     7. PATT:    PATT:  Discussed the general effects of drugs and alcohol on health and well-being. Provider gave patient printed information about the effects of chemical use on their health and well  being. Recommendations: Patient declines any substance abuse issues or needs at this time..     8. Records:   These were reviewed at time of assessment.   Information in this assessment was obtained from the medical record and  provided by patient who is a good historian.    Patient will have open access to their mental health medical record.        Provider Name/ Credentials:  Aimee Ballard  October 11, 2022        Answers for HPI/ROS submitted by the patient on 10/10/2022  If you checked off any problems, how difficult have these problems made it for you to do your work, take care of things at home, or get along with other people?: Somewhat difficult  PHQ9 TOTAL SCORE: 6  LUCIAN 7 TOTAL SCORE: 7

## 2022-10-11 NOTE — PATIENT INSTRUCTIONS
Thank you for our time together today in Collaborative [facility] ([facility] CCPS provides brief psychiatric medication stabilization to patients referred by their Primary Care Providers. Patients are typically seen in CCPS for up to 4 appointments and then referred back to the PCP for ongoing refills unless longer term medication management by a specialist is indicated. If I believe you will benefit from long-term psychiatric care I will discuss this with you. If you are interested in seeing a psychiatrist or psychiatric nurse practitioner long-term, please send me a message in [employer] so we can refer you appropriately.     Treatment Plan:    Start sertraline 50mg daily x 7 days then increase to 100mg daily for anxiety/depression  Restart prazosin 1mg at bedtime for nightmares. If you don't notice any improvement in the frequency or intensity of your nightmares, increase to 2 capsules at bedtime.  Call [phone] or [phone] to schedule follow up with me in 4 weeks.  Here is the website for [facility] if [facility] is too expensive: https://costplusdrugs.com/medications/

## 2022-10-11 NOTE — PROGRESS NOTES
Mariola Abby Char  is being evaluated via a billable video visit.      How would you like to obtain your AVS? [employer]  For the video visit, send the invitation by: Text to cell phone: [phone]  Will anyone else be joining your video visit? Urvashi LAMB

## 2022-10-17 ENCOUNTER — PATIENT OUTREACH (OUTPATIENT)
Dept: CARE COORDINATION | Facility: CLINIC | Age: 47
End: 2022-10-17

## 2022-10-17 NOTE — PROGRESS NOTES
Clinic Care Coordination Contact  [facility]/Voicemail       Clinical Data: SW/Care Coordinator Outreach.  Per SW/CC chart review, pt saw APRN at [facility] Psychiatry [facility] on 10-11-22. See note for details.  Pt was prescribed mental health medications and indicated difficulty paying for them.  APRN provided pt with [facility] if medications were too expensive at [facility]       Outreach attempted x 1.  Left message on patient's voicemail with call back information and requested return call.  Plan: SW/Care Coordinator will await return call from pt.  If pt does not call back in 5-7- business days, SW/CC will outreach to pt again.    John Charles, SW/CC, for Connie Camarena, SW/CC   [facility] Care Coordination  [phone]

## 2022-10-24 ENCOUNTER — VIRTUAL VISIT (OUTPATIENT)
Dept: FAMILY MEDICINE | Facility: CLINIC | Age: 47
End: 2022-10-24
Payer: COMMERCIAL

## 2022-10-24 DIAGNOSIS — F41.1 GENERALIZED ANXIETY DISORDER: Primary | ICD-10-CM

## 2022-10-24 DIAGNOSIS — F31.4 BIPOLAR DISORDER, CURRENT EPISODE DEPRESSED, SEVERE, WITHOUT PSYCHOTIC FEATURES (H): ICD-10-CM

## 2022-10-24 DIAGNOSIS — F51.04 PSYCHOPHYSIOLOGICAL INSOMNIA: ICD-10-CM

## 2022-10-24 DIAGNOSIS — F43.10 PTSD (POST-TRAUMATIC STRESS DISORDER): ICD-10-CM

## 2022-10-24 PROCEDURE — 99214 OFFICE O/P EST MOD 30 MIN: CPT | Mod: TEL | Performed by: FAMILY MEDICINE

## 2022-10-24 RX ORDER — HYDROXYZINE HYDROCHLORIDE 25 MG/1
25-50 TABLET, FILM COATED ORAL 3 TIMES DAILY PRN
Qty: 50 TABLET | Refills: 1 | Status: SHIPPED | OUTPATIENT
Start: 2022-10-24

## 2022-10-24 ASSESSMENT — ANXIETY QUESTIONNAIRES
GAD7 TOTAL SCORE: 8
3. WORRYING TOO MUCH ABOUT DIFFERENT THINGS: SEVERAL DAYS
5. BEING SO RESTLESS THAT IT IS HARD TO SIT STILL: NOT AT ALL
1. FEELING NERVOUS, ANXIOUS, OR ON EDGE: SEVERAL DAYS
IF YOU CHECKED OFF ANY PROBLEMS ON THIS QUESTIONNAIRE, HOW DIFFICULT HAVE THESE PROBLEMS MADE IT FOR YOU TO DO YOUR WORK, TAKE CARE OF THINGS AT HOME, OR GET ALONG WITH OTHER PEOPLE: NOT DIFFICULT AT ALL
2. NOT BEING ABLE TO STOP OR CONTROL WORRYING: NEARLY EVERY DAY
7. FEELING AFRAID AS IF SOMETHING AWFUL MIGHT HAPPEN: SEVERAL DAYS
GAD7 TOTAL SCORE: 8
6. BECOMING EASILY ANNOYED OR IRRITABLE: SEVERAL DAYS

## 2022-10-24 ASSESSMENT — PATIENT HEALTH QUESTIONNAIRE - PHQ9
5. POOR APPETITE OR OVEREATING: SEVERAL DAYS
SUM OF ALL RESPONSES TO PHQ QUESTIONS 1-9: 10

## 2022-10-24 NOTE — PROGRESS NOTES
Mariola is a 47 year old who is being evaluated via a billable telephone visit.      What phone number would you like to be contacted at? [phone]  How would you like to obtain your AVS? MyChart  ____________________________    Virtual Visit - Telephone Encounter  [employer]  Date of Service: 10/24/2022    Subjective:  Chief Complaint   Patient presents with     Depression      Long week at work. Was lead on Saturday and was stressful. Had anxiety attack. Can take a break, when needed. Supervisor supportive.     Having a lot of anxiety about her son. Son is in hopTo training @ [employer]. She is worried about him. Worried about alcohol use. Son using mushrooms. , , experiencing hazing.     Has therapy scheduled for January. Earliest appointment.   Will be following up with NP in a month.     When having anxiety: goes for walk, prays, cleans and organizes.     Taking sertraline. Takes in the morning.   Sleep: going to bed at 2-3 am. Not sure what's bothering her. Some mid-day caffeine.   Thinks that her diagnosis of Bipolar depression may not be accurate. It was made when she was using alcohol and substances. She thinks she probably has depression and anxiety.    PHQ 10/10/2022 10/10/2022 10/24/2022   PHQ-9 Total Score 6 6 10   Q9: Thoughts of better off dead/self-harm past 2 weeks Not at all Not at all Not at all   F/U: Thoughts of suicide or self-harm - - -   F/U: Safety concerns - - -       LUCIAN-7 SCORE 10/10/2022 10/10/2022 10/24/2022   Total Score - 7 (mild anxiety) -   Total Score 7 7 8     Objective:         Speech is normal  Patient is calm    Assessment & Plan:    Generalized anxiety disorder    Now linked in with mental health nurse practitioner, therapy appointment pending.    Coping skills discussed.    Reports doing well on sertraline.  No obvious symptoms of gracia.    Treat insomnia with hydroxyzine 25 to 50 mg as needed at  bedtime.    Historic diagnosis of bipolar affective disorder.    Mariola feels this is likely an erroneous diagnosis based on symptoms that she was experiencing when using alcohol and drugs.  Discussed with her that I will put this on her past medical history, but it will be important to monitor for any emerging symptoms of gracia during her treatment.  She agrees.    Barrier to health care: Cost of medications  Was unable to  quetiapine and prazosin due to cost.  Referral made for [facility] pharmacist, to assess for lower cost medication alternatives.    Order Summary    ICD-10-CM    1. Generalized anxiety disorder  F41.1 Med Therapy Management Referral      2. Psychophysiological insomnia  F51.04 hydrOXYzine (ATARAX) 25 MG tablet      No future appointments.   Completed by: Gisella Licea M.D., [facility]. 10/24/2022 10:47 AM.  This transcription uses voice recognition software, which may contain typographical errors.  Start call: 10:47 AM. End call: 11:01 AM .  Physician location: [facility], on site.  ____________________________

## 2022-10-26 ENCOUNTER — TELEPHONE (OUTPATIENT)
Dept: FAMILY MEDICINE | Facility: CLINIC | Age: 47
End: 2022-10-26

## 2022-10-26 NOTE — TELEPHONE ENCOUNTER
MTM referral from: [facility] visit (referral by provider)    MTM referral outreach attempt #2 on October 26, 2022 at 1:38 PM      Outcome: Patient not reachable after several attempts, will route to MT Pharmacist/Provider as an FYI.  [facility] scheduling number is [phone].  Thank you for the referral.    Mónica Olea [facility]

## 2022-11-19 ENCOUNTER — HEALTH MAINTENANCE LETTER (OUTPATIENT)
Age: 47
End: 2022-11-19

## 2022-12-07 ENCOUNTER — TELEPHONE (OUTPATIENT)
Dept: PSYCHIATRY | Facility: CLINIC | Age: 47
End: 2022-12-07

## 2022-12-07 NOTE — TELEPHONE ENCOUNTER
Last prazosin script noted as discontinued. Refill request for med denied by RN due to med discontinuation.    prazosin (MINIPRESS) 1 MG capsule (Discontinued) 60 capsule 0 10/11/2022 10/24/2022 --   Sig: Take 1 capsule by mouth at bedtime for nightmares. If you don't notice any improvement in your nightmares, increase to 2 capsules by mouth at bedtime.   Patient not taking: Reported on 10/24/2022        Sent to pharmacy as: Prazosin HCl 1 MG Oral Capsule (MINIPRESS)   Class: E-Prescribe   Order: [id number]   E-Prescribing Status: Receipt confirmed by pharmacy (10/11/2022  8:33 AM CDT)       TREATMENT PLAN: Medication side effects and alternatives reviewed. Health promotion activities recommended and reviewed. All questions addressed. Education and counseling completed regarding risks and benefits of medications and psychotherapy options. Collaborative Care Psychiatry Service model reviewed today. Recommend therapy for additional support. Safety plan reviewed as indicated.     MEDICATIONS:   -start SERTRALINE 50mg x 7 days then increase to 100mg  -start PRAZOSIN 1mg at bedtime; ok to increase to 2mg if no effect     LABS/RADS:   -Last Labs Obtained: Sept 2022, reviewed     PATIENT STATUS:  [facility] offer care a specialty service for Primary Care Providers in the [facility] that seek to optimize psychotropic medications for unstable patients.  Once medications have been optimized, our providers discharge the patient back to the referring Primary Care Provider for ongoing medication management.  This type of system allows our providers to serve a high volume of patients.   -Pt will be seen for continued medication stabilization in [facility].     PSYCHOSOCIAL:   -consider therapy  -Follow up with primary care provider as planned or for acute medical concerns.

## 2022-12-08 NOTE — TELEPHONE ENCOUNTER
Called patient to clarify. We stopped doing that [place] I am not having nightmares anymore. Stopped it like a month ago. Patient not sure as to what specific name the med is but she knows it is the med she was taking for the nightmare.     She will send a [employer] message when she gets home to help clarify.     prazosin (MINIPRESS) 1 MG capsule (Discontinued) 60 capsule 0 10/11/2022 10/24/2022 --   Sig: Take 1 capsule by mouth at bedtime for nightmares. If you don't notice any improvement in your nightmares, increase to 2 capsules by mouth at bedtime.

## 2022-12-22 NOTE — TELEPHONE ENCOUNTER
Called patient to follow up, no answer, left voice message for patient to return call or send mychart message as she noted-with clarification.

## 2022-12-29 ENCOUNTER — PATIENT OUTREACH (OUTPATIENT)
Dept: CARE COORDINATION | Facility: CLINIC | Age: 47
End: 2022-12-29

## 2022-12-29 NOTE — PROGRESS NOTES
CLINIC Care Coordination Contact    12.29.22  Pt. returned  CCC call. She is at work, but will call Lead SW CC back tomorrow.      SHANNA Patricia /for Connie TOERO  [facility] Primary [facility]   Care Coordination  [facility]  12/29/2022 11:25 AM      Clinic Care Coordination Contact  [facility]/Voicemail       Clinical Data:SW  Care Coordinator Outreach    Outreach attempted x 1.  Left message on patient's voicemail with call back information and requested return call.    Plan:  If no response,  Care Coordinator will try to reach patient again in 10 business days.      SHANNA Patricia / for SHANNA Burt [facility] Primary Care   Care Coordination  [facility]  12/29/2022 11:03 AM

## 2023-01-17 ENCOUNTER — PATIENT OUTREACH (OUTPATIENT)
Dept: CARE COORDINATION | Facility: CLINIC | Age: 48
End: 2023-01-17
Payer: COMMERCIAL

## 2023-01-17 NOTE — PROGRESS NOTES
Clinic Care Coordination Contact    CC SW send updated Care Plan to pt as pt was due to have it sent every days 90 or with a change in condition.    SHANNA Burt   Social Work Care Coordinator   [facility]    [phone]

## 2023-01-17 NOTE — LETTER
[facility]  Patient Centered Plan of Care  About Me:        Patient Name:  Mariola Pardo    YOB: 1975  Age:         47 year old   May MRN:    [id number] Telephone Information:  Home Phone [phone]   Mobile [phone]   Home Phone [phone]       Address:  [address] [place] [address] Email address:  [email or web address]      Emergency Contact(s)    Name Relationship Lgl Grd Work Phone Home Phone Mobile Phone   MARK GIORDANO* Daughter   [phone]            Primary language:  English     needed? No   [place] [facility]:  [phone] op. 1  Other communication barriers:None    Preferred Method of Communication:  Mail  Current living arrangement: I live in a private home with family    Mobility Status/ Medical Equipment: Independent        Health Maintenance  Health Maintenance Reviewed: Due/overdue  Overdue          Never   Done MAMMO SCREENING (Yearly)   Last ordered: Sep 29, 2022     Never   Done Pneumococcal Vaccine: Pediatrics (0 to 5 Years) and At-Risk Patients (6 to 64 Years) (1 - PCV)   Last ordered: Aug 4, 2022     Never   Done COLORECTAL CANCER SCREENING (COLONOSCOPY - Preferred) (Every 10 Years)     NOV 8 2015 HEPATITIS B IMMUNIZATION (3 of 3 - 19+ 3-dose series)  Last completed: Jun 8, 2015 NOV 4 2021 COVID-19 Vaccine (3 - Booster for Pfizer series)   Last ordered: Aug 4, 2022     FINESSE 1   2023 PHQ-2 (once per calendar year) (Yearly, January to December)  Last completed: Oct 24, 2022         My Access Plan  Medical Emergency 911   Primary Clinic Line   - [phone]   24 Hour Appointment Line [phone] or  [phone]) (toll-free)   24 Hour Nurse Line [phone] (toll-free)   Preferred Urgent Care [facility] [phone]     [facility]  [phone]     Preferred Pharmacy [id number] - [place], [place] - [address]     Behavioral Health Crisis Line The National Suicide Prevention Lifeline at 1-183.451.3062 or Text/Call 988             My Care Team Members  Patient Care Team       Relationship Specialty Notifications Start End    Gisella Licea MD PCP - General Family Medicine  8/4/22     Phone: [phone] Fax: [phone]         [address] [facility] [address]    Connie Camarena, LSW Lead Care Coordinator  Admissions 8/12/22     Gisella Licea MD Assigned PCP   8/20/22     Phone: [phone] Fax: [phone]         [address] [facility] [address]    Debora Oseguera NP Assigned Neuroscience Provider   10/15/22     Phone: [phone] Fax: [phone] [address] [place] [address]    Kenn Soto, PharmD Pharmacist Pharmacist  11/3/22     Phone: [phone]          [facility] [address] [place] [address]            My Care Plans  Self Management and Treatment Plan  Care Plan  Care Plan: Food Insecurity     Problem: Reliable food source     Goal: Establish Options for Affordable Food Sources     Start Date: 8/12/2022 Expected End Date: 2/12/2023    Note:     Goal Statement: I want to go to food eas[place] near me to increase food security  Date Goal set: 8/12/2022  Barriers: Social support  Strengths: Children  Date to Achieve By: 2/12/2023  Patient expressed understanding of goal: Yes  Action steps to achieve this goal:  1. I will look into the food [place] found by  [employer] and begin getting food support  2. I will connect with [facility] team monthly to assess goal progress and discuss any other concerns or questions                     Care Plan: Mental Health     Problem: Mood/Psychosocial Concerns     Goal: Establish Mental Health Support     Start Date: 8/12/2022 Expected End Date: 2/12/2023    Note:     Goal Statement: I want to find holistic mental health support for myself  Date Goal set: 8/12/2022  Barriers: Social Support  Strengths: Children  Date to Achieve  By: 2/12/2023  Patient expressed understanding of goal: Yes  Action steps to achieve this goal:  1. I will look into the resources sent to me by  [employer] regarding mental health support   2. I will reach out, schedule, and participate in the resources that I find and want to become involved in  3. I will connect with CCC team monthly to assess goals                           Action Plans on File:                       Advance Care Plans/Directives Type:   No data recorded    My Medical and Care Information  Problem List   Patient Active Problem List   Diagnosis     High grade squamous intraepithelial lesion (HGSIL), grade 3 MERLYN, on biopsy of cervix     Reaction to chronic stress     PTSD (post-traumatic stress disorder)     Alcohol dependence in remission (H)     Generalized anxiety disorder      Current Medications and Allergies:  See printed Medication Report.    Care Coordination Start Date: 8/12/2022   Frequency of Care Coordination: monthly     Form Last Updated: 01/17/2023

## 2023-01-30 ENCOUNTER — OFFICE VISIT (OUTPATIENT)
Dept: FAMILY MEDICINE | Facility: CLINIC | Age: 48
End: 2023-01-30
Payer: COMMERCIAL

## 2023-01-30 VITALS
WEIGHT: 187.1 LBS | RESPIRATION RATE: 20 BRPM | OXYGEN SATURATION: 97 % | HEART RATE: 69 BPM | BODY MASS INDEX: 32.12 KG/M2 | TEMPERATURE: 98.4 F | DIASTOLIC BLOOD PRESSURE: 81 MMHG | SYSTOLIC BLOOD PRESSURE: 130 MMHG

## 2023-01-30 DIAGNOSIS — H92.03 OTALGIA, BILATERAL: ICD-10-CM

## 2023-01-30 DIAGNOSIS — Z87.09 HISTORY OF ALLERGIC RHINITIS: ICD-10-CM

## 2023-01-30 DIAGNOSIS — R05.1 ACUTE COUGH: ICD-10-CM

## 2023-01-30 DIAGNOSIS — R07.89 CHEST WALL TENDERNESS: Primary | ICD-10-CM

## 2023-01-30 LAB — SARS-COV-2 RNA RESP QL NAA+PROBE: NEGATIVE

## 2023-01-30 PROCEDURE — U0003 INFECTIOUS AGENT DETECTION BY NUCLEIC ACID (DNA OR RNA); SEVERE ACUTE RESPIRATORY SYNDROME CORONAVIRUS 2 (SARS-COV-2) (CORONAVIRUS DISEASE [COVID-19]), AMPLIFIED PROBE TECHNIQUE, MAKING USE OF HIGH THROUGHPUT TECHNOLOGIES AS DESCRIBED BY CMS-2020-01-R: HCPCS | Performed by: NURSE PRACTITIONER

## 2023-01-30 PROCEDURE — U0005 INFEC AGEN DETEC AMPLI PROBE: HCPCS | Performed by: NURSE PRACTITIONER

## 2023-01-30 PROCEDURE — 99213 OFFICE O/P EST LOW 20 MIN: CPT | Mod: CS | Performed by: NURSE PRACTITIONER

## 2023-01-30 ASSESSMENT — ENCOUNTER SYMPTOMS
RHINORRHEA: 0
COUGH: 1
DIARRHEA: 0
NAUSEA: 0
CHILLS: 0
VOMITING: 0
FEVER: 0

## 2023-01-30 NOTE — PATIENT INSTRUCTIONS
"Ice pack to side up to 20 minutes at a time up to every 2 hours.      Ibuprofen 600 mg 4 times daily.      Try cleaning \"leftie.\"      Cough suppressant like Delsym or Dayquil     Recheck if still coughing in a couple weeks.      Continue clartin.  Consider Flonase if still coughing and you think it's due to dust.    "

## 2023-01-30 NOTE — PROGRESS NOTES
Assessment & Plan     Acute cough    - Symptomatic COVID-19 Virus (Coronavirus) by PCR Nose    Chest wall tenderness  -Ibuprofen, ice, work modification.  Denies need for work note or work restriction.    History of allergic rhinitis  -Continue Claritin.  Consider Flonase if cough is persistent.    Otalgia, bilateral  -Normal exam       Focused exam and history done due to COVID-19 pandemic in a walk-in setting.      History, exam, and vital signs consistent with a viral URI vs allergies.  Tender on right lateral wall muscles.  She can reproduce the symptoms with rotation of trunk and she works as a .  Low suspicion for serious underlying pathology such as PE, pneumonia.    Recheck if shortness of breath or new fevers develop.  Rest.     OTCs recommended: None [   ].  Dextromethorphan  [ x ], guaifenesin [  ], pseudoephedrine [   ], Afrin for no greater than 3 days [  ], Tylenol or ibuprofen [ x ].                Return in about 2 weeks (around 2/13/2023).    Jodie Post [facility]    Joss Garnett is a 47 year old female who presents to clinic today for the following health issues:  Chief Complaint   Patient presents with     Ear Problem     Both ears sharp and stabbing pain x 2 days. No drainage, some dizziness, no headaches or fever.     Cough     X 2 days. Rt side at ribcage when dry cough. No congestion, some SOB, pt states Rt side ribcage pain when take deep breath. No vomit/nausea. No covid test.     HPI    Dry cough for 2 days with right-sided rib cage pain.    No congestion.    Intermittent sharp, stabbing ear pain.    Previous smoker - quit 2020.      Works in  as a .      No leg pain or swelling.      Around a lot of dust and chemicals.      Takes Claritin for allergies.        Review of Systems   Constitutional: Negative for chills and fever.   HENT: Positive for ear pain. Negative for congestion, hearing loss and rhinorrhea.     Respiratory: Positive for cough (dry ).    Gastrointestinal: Negative for diarrhea, nausea and vomiting.           Objective    /81 (BP Location: Right arm, Patient Position: Sitting, Cuff Size: Adult Regular)   Pulse 69   Temp 98.4  F (36.9  C) (Oral)   Resp 20   Wt 84.9 kg (187 lb 1.6 oz)   LMP 01/16/2023 (Exact Date)   SpO2 97%   BMI 32.12 kg/m    Physical Exam  Constitutional:       General: She is not in acute distress.     Appearance: She is well-developed.   HENT:      Right Ear: Tympanic membrane normal.      Left Ear: Tympanic membrane normal.   Eyes:      General:         Right eye: No discharge.         Left eye: No discharge.      Conjunctiva/sclera: Conjunctivae normal.   Cardiovascular:      Rate and Rhythm: Normal rate and regular rhythm.      Pulses: Normal pulses.      Heart sounds: Normal heart sounds.   Pulmonary:      Effort: Pulmonary effort is normal.      Breath sounds: Normal breath sounds. No wheezing or rhonchi.   Musculoskeletal:         General: Tenderness (Right lateral chest wall which reproduces the area of pain.) present. Normal range of motion.   Skin:     General: Skin is warm and dry.      Capillary Refill: Capillary refill takes less than 2 seconds.   Neurological:      Mental Status: She is alert and oriented to person, place, and time.   Psychiatric:         Mood and Affect: Mood normal.         Behavior: Behavior normal.         Thought Content: Thought content normal.         Judgment: Judgment normal.

## 2023-02-08 ENCOUNTER — PATIENT OUTREACH (OUTPATIENT)
Dept: NURSING | Facility: CLINIC | Age: 48
End: 2023-02-08
Payer: COMMERCIAL

## 2023-02-08 NOTE — PROGRESS NOTES
Clinic Care Coordination Contact  [facility]/Voicemail       Clinical Data: Care Coordinator Outreach  Outreach attempted x 1.  Left message on patient's voicemail with call back information and requested return call.  Plan:  Care Coordinator will try to reach patient again in 3-5 business days.    SHANNA Burt   Social Work Care Coordinator   [facility]    [phone]

## 2023-02-10 ENCOUNTER — OFFICE VISIT (OUTPATIENT)
Dept: OPHTHALMOLOGY | Facility: CLINIC | Age: 48
End: 2023-02-10
Payer: COMMERCIAL

## 2023-02-10 DIAGNOSIS — H52.4 MYOPIA OF BOTH EYES WITH ASTIGMATISM AND PRESBYOPIA: Primary | ICD-10-CM

## 2023-02-10 DIAGNOSIS — H52.13 MYOPIA OF BOTH EYES WITH ASTIGMATISM AND PRESBYOPIA: Primary | ICD-10-CM

## 2023-02-10 DIAGNOSIS — H52.203 MYOPIA OF BOTH EYES WITH ASTIGMATISM AND PRESBYOPIA: Primary | ICD-10-CM

## 2023-02-10 PROCEDURE — 92015 DETERMINE REFRACTIVE STATE: CPT | Performed by: OPTOMETRIST

## 2023-02-10 PROCEDURE — 92004 COMPRE OPH EXAM NEW PT 1/>: CPT | Performed by: OPTOMETRIST

## 2023-02-10 ASSESSMENT — CONF VISUAL FIELD
OS_SUPERIOR_TEMPORAL_RESTRICTION: 0
OD_SUPERIOR_NASAL_RESTRICTION: 0
OS_SUPERIOR_NASAL_RESTRICTION: 0
OS_NORMAL: 1
OS_INFERIOR_NASAL_RESTRICTION: 0
OD_SUPERIOR_TEMPORAL_RESTRICTION: 0
OD_NORMAL: 1
OD_INFERIOR_TEMPORAL_RESTRICTION: 0
METHOD: COUNTING FINGERS
OD_INFERIOR_NASAL_RESTRICTION: 0
OS_INFERIOR_TEMPORAL_RESTRICTION: 0

## 2023-02-10 ASSESSMENT — REFRACTION_MANIFEST
OD_AXIS: 064
OD_SPHERE: -2.25
OS_AXIS: 101
OD_ADD: +1.00
OS_CYLINDER: +0.25
OS_SPHERE: -2.50
OD_CYLINDER: +0.25
OS_ADD: +1.00

## 2023-02-10 ASSESSMENT — VISUAL ACUITY
METHOD: SNELLEN - LINEAR
OS_CC: 20/20
OD_CC: 20/25

## 2023-02-10 ASSESSMENT — REFRACTION
OS_AXIS: 105
OD_CYLINDER: +0.50
OS_SPHERE: -2.25
OD_SPHERE: -2.00
OS_CYLINDER: SPHERE
OD_AXIS: 040
OD_AXIS: 060
OS_SPHERE: -1.50
OD_SPHERE: -1.50
OD_CYLINDER: +0.25
OS_CYLINDER: +0.25

## 2023-02-10 ASSESSMENT — SLIT LAMP EXAM - LIDS
COMMENTS: NORMAL
COMMENTS: NORMAL

## 2023-02-10 ASSESSMENT — EXTERNAL EXAM - LEFT EYE: OS_EXAM: NORMAL

## 2023-02-10 ASSESSMENT — TONOMETRY
OS_IOP_MMHG: 15
OD_IOP_MMHG: 14
IOP_METHOD: ICARE

## 2023-02-10 ASSESSMENT — CUP TO DISC RATIO
OD_RATIO: 0.30
OS_RATIO: 0.30

## 2023-02-10 ASSESSMENT — REFRACTION_WEARINGRX
OD_AXIS: 067
OD_CYLINDER: +0.50
OD_SPHERE: -1.25
OS_CYLINDER: +1.00
OS_AXIS: 099
OS_SPHERE: -2.00

## 2023-02-10 ASSESSMENT — EXTERNAL EXAM - RIGHT EYE: OD_EXAM: NORMAL

## 2023-02-10 NOTE — PROGRESS NOTES
A/P  1.) Myopia/Astig/Presbyopia OU  -Slight shift in Rx, increase in distance prescription  -Pt currently removing glasses to read, would prefer DVO and remove to read vs bifocal  -Dilated ocular health unremarkable OU, stable floaters OU    Monitor 1-2 years comprehensive, sooner prn    I have confirmed the patient's CC, HPI and reviewed Past Medical History, Past Surgical History, Social History, Family History, Problem List, Medication List and agree with Tech note.     Nayeli Giles, OD FAAO [facility]SLS

## 2023-02-10 NOTE — NURSING NOTE
Chief Complaints and History of Present Illnesses   Patient presents with     Annual Eye Exam     Chief Complaint(s) and History of Present Illness(es)     Annual Eye Exam            Laterality: both eyes    Associated symptoms: floaters.  Negative for dryness, eye pain, redness and flashes    Treatments tried: no treatments    Pain scale: 0/10          Comments    Patient present for annual exam. Patient states her glasses are about 5 years old and that is also when she had her last eye exam. 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   NOAM Connell February 10, 2023 9:21 AM

## 2023-04-06 ENCOUNTER — PATIENT OUTREACH (OUTPATIENT)
Dept: CARE COORDINATION | Facility: CLINIC | Age: 48
End: 2023-04-06
Payer: COMMERCIAL

## 2023-04-06 NOTE — PROGRESS NOTES
Clinic Care Coordination Contact    Situation: Patient chart reviewed by care coordinator.     Background: CC SW to assess if appropriate for CCC enrollment to close     Assessment: CC SW received notice from CHW for chart review to see if next steps are to close CCC or another outreach attempt should be made. SW determined it was appropriate for CCC to close.     Plan/Recommendations: Pt enrollment in CCC closed

## 2023-04-06 NOTE — PROGRESS NOTES
Clinic Care Coordination Contact  [facility]/Voicemail       Clinical Data: Care Coordinator Outreach  Outreach attempted x 1.  Left message on patient's voicemail with call back information and requested return call.  Plan: Care Coordinator will send disenrollment letter with care coordinator contact information via mail. Care Coordinator will do no further outreaches at this time.    SHANNA Burt   Social Work Care Coordinator   [facility]    [phone]

## 2023-04-06 NOTE — LETTER
[facility] CARE COORDINATION  [address] [facility] [address]   April 6, 2023    Mariola Pardo  [address]  [place] [address]      Dear Mariola,    I have been unsuccessful in reaching you since our last contact. At this time the Care Coordination team will make no further attempts to reach you, however this does not change your ability to continue receiving care from your providers at your primary care clinic. If you need additional support from a care coordinator in the future please contact Care Coordination at [phone].    All of us at [facility] are invested in your health and are here to assist you in meeting your goals.     Sincerely,    SHANNA Burt   Social Work Care Coordinator   [facility]    [phone]

## 2023-08-30 ENCOUNTER — PATIENT OUTREACH (OUTPATIENT)
Dept: CARE COORDINATION | Facility: CLINIC | Age: 48
End: 2023-08-30
Payer: COMMERCIAL

## 2023-09-13 ENCOUNTER — PATIENT OUTREACH (OUTPATIENT)
Dept: CARE COORDINATION | Facility: CLINIC | Age: 48
End: 2023-09-13
Payer: COMMERCIAL

## 2023-09-13 ENCOUNTER — PATIENT OUTREACH (OUTPATIENT)
Dept: FAMILY MEDICINE | Facility: CLINIC | Age: 48
End: 2023-09-13
Payer: COMMERCIAL

## 2023-10-05 NOTE — TELEPHONE ENCOUNTER
Patient due for Pap and HPV.    Reminder done today via telephone call-vm full unable to leave a voice mail message.

## 2023-10-06 NOTE — TELEPHONE ENCOUNTER
Patient due for Pap and HPV.    Reminder done today via telephone call-spoke to the pt she said that she will schedule this appt through [facility].

## 2023-11-07 NOTE — TELEPHONE ENCOUNTER
Hi Dr. Licea,   Patient is lost to pap tracking follow-up. Attempts to contact pt have been made per reminder process and there has been no reply and/or no appt scheduled.     Pap Hx:  7/2/14 LSIL pap/+ HR HPV (not 16 or 18)  (care everywhere)  07/2/14: [place] Bx and ECC Neg for Dysplasia. (care everywhere)  2016 Patient reported, abnormal pap at the [facility]. They took a sample of cells and it was reassuring.  1/19/17 NIL Pap/+ HR HPV (not 16 or 18). Plan: [place] bef 4/19/17 2/2/17 Pt. Advised.  12/20/17 Patient is lost to follow-up.  05/2/19: ASCUS Pap, + HR HPV (not 16 or 18) result. Plan [place].   12/12/19 Patient is lost to pap tracking follow-up.   01/17/20 [place] MERLYN 2-3, ECC MERLYN 1 (care everywhere)  02/11/20 LEEP MERLYN 2 present at margins (care everywhere)  09/29/22 NIL Pap, Neg HR HPV Plan cotest in 1 year due 09/29/23 09/13/23 Reminder Immune System Therapeuticst  10/5/23 Reminder call-vm full   10/6/23 Reminder call-spoke to the pt she said that she will schedule this appt through [employer].  11/7/23 Lost to follow-up for pap tracking, joseph routed to provider

## 2023-11-18 ENCOUNTER — HEALTH MAINTENANCE LETTER (OUTPATIENT)
Age: 48
End: 2023-11-18

## 2024-01-27 ENCOUNTER — HEALTH MAINTENANCE LETTER (OUTPATIENT)
Age: 49
End: 2024-01-27

## 2024-02-01 ENCOUNTER — OFFICE VISIT (OUTPATIENT)
Dept: FAMILY MEDICINE | Facility: CLINIC | Age: 49
End: 2024-02-01
Payer: COMMERCIAL

## 2024-02-01 VITALS
OXYGEN SATURATION: 98 % | WEIGHT: 187 LBS | TEMPERATURE: 97.6 F | BODY MASS INDEX: 31.92 KG/M2 | HEART RATE: 66 BPM | SYSTOLIC BLOOD PRESSURE: 125 MMHG | HEIGHT: 64 IN | RESPIRATION RATE: 20 BRPM | DIASTOLIC BLOOD PRESSURE: 80 MMHG

## 2024-02-01 DIAGNOSIS — Z13.6 SCREENING FOR CARDIOVASCULAR CONDITION: ICD-10-CM

## 2024-02-01 DIAGNOSIS — B96.89 BACTERIAL VAGINOSIS: ICD-10-CM

## 2024-02-01 DIAGNOSIS — F10.21 ALCOHOL DEPENDENCE IN REMISSION (H): ICD-10-CM

## 2024-02-01 DIAGNOSIS — Z91.09 ENVIRONMENTAL ALLERGIES: ICD-10-CM

## 2024-02-01 DIAGNOSIS — N76.0 BACTERIAL VAGINOSIS: ICD-10-CM

## 2024-02-01 DIAGNOSIS — Z12.4 CERVICAL CANCER SCREENING: ICD-10-CM

## 2024-02-01 DIAGNOSIS — N39.46 MIXED STRESS AND URGE URINARY INCONTINENCE: ICD-10-CM

## 2024-02-01 DIAGNOSIS — N89.8 VAGINAL DISCHARGE: ICD-10-CM

## 2024-02-01 DIAGNOSIS — E11.9 TYPE 2 DIABETES MELLITUS WITHOUT COMPLICATION, WITHOUT LONG-TERM CURRENT USE OF INSULIN (H): ICD-10-CM

## 2024-02-01 DIAGNOSIS — Z00.00 ROUTINE GENERAL MEDICAL EXAMINATION AT A HEALTH CARE FACILITY: Primary | ICD-10-CM

## 2024-02-01 DIAGNOSIS — Z12.11 SCREEN FOR COLON CANCER: ICD-10-CM

## 2024-02-01 DIAGNOSIS — Z12.31 VISIT FOR SCREENING MAMMOGRAM: ICD-10-CM

## 2024-02-01 DIAGNOSIS — Z83.3 FAMILY HISTORY OF DIABETES MELLITUS: ICD-10-CM

## 2024-02-01 LAB
CLUE CELLS: PRESENT
HBA1C MFR BLD: 6.6 % (ref 0–5.6)
TRICHOMONAS, WET PREP: ABNORMAL
WBC'S/HIGH POWER FIELD, WET PREP: ABNORMAL
YEAST, WET PREP: ABNORMAL

## 2024-02-01 PROCEDURE — 36415 COLL VENOUS BLD VENIPUNCTURE: CPT | Performed by: FAMILY MEDICINE

## 2024-02-01 PROCEDURE — 90472 IMMUNIZATION ADMIN EACH ADD: CPT | Performed by: FAMILY MEDICINE

## 2024-02-01 PROCEDURE — 90677 PCV20 VACCINE IM: CPT | Performed by: FAMILY MEDICINE

## 2024-02-01 PROCEDURE — 87624 HPV HI-RISK TYP POOLED RSLT: CPT | Performed by: FAMILY MEDICINE

## 2024-02-01 PROCEDURE — 87491 CHLMYD TRACH DNA AMP PROBE: CPT | Performed by: FAMILY MEDICINE

## 2024-02-01 PROCEDURE — 82947 ASSAY GLUCOSE BLOOD QUANT: CPT | Performed by: FAMILY MEDICINE

## 2024-02-01 PROCEDURE — G0145 SCR C/V CYTO,THINLAYER,RESCR: HCPCS | Performed by: FAMILY MEDICINE

## 2024-02-01 PROCEDURE — 99213 OFFICE O/P EST LOW 20 MIN: CPT | Mod: 25 | Performed by: FAMILY MEDICINE

## 2024-02-01 PROCEDURE — 83036 HEMOGLOBIN GLYCOSYLATED A1C: CPT | Performed by: FAMILY MEDICINE

## 2024-02-01 PROCEDURE — 90713 POLIOVIRUS IPV SC/IM: CPT | Performed by: FAMILY MEDICINE

## 2024-02-01 PROCEDURE — 87210 SMEAR WET MOUNT SALINE/INK: CPT | Performed by: FAMILY MEDICINE

## 2024-02-01 PROCEDURE — 90471 IMMUNIZATION ADMIN: CPT | Performed by: FAMILY MEDICINE

## 2024-02-01 PROCEDURE — 80061 LIPID PANEL: CPT | Performed by: FAMILY MEDICINE

## 2024-02-01 PROCEDURE — 87591 N.GONORRHOEAE DNA AMP PROB: CPT | Performed by: FAMILY MEDICINE

## 2024-02-01 PROCEDURE — G0124 SCREEN C/V THIN LAYER BY MD: HCPCS | Performed by: PATHOLOGY

## 2024-02-01 PROCEDURE — 90746 HEPB VACCINE 3 DOSE ADULT IM: CPT | Performed by: FAMILY MEDICINE

## 2024-02-01 PROCEDURE — 99396 PREV VISIT EST AGE 40-64: CPT | Mod: 25 | Performed by: FAMILY MEDICINE

## 2024-02-01 RX ORDER — FLUTICASONE PROPIONATE 50 MCG
2 SPRAY, SUSPENSION (ML) NASAL DAILY
Qty: 16 G | Refills: 11 | Status: SHIPPED | OUTPATIENT
Start: 2024-02-01

## 2024-02-01 RX ORDER — METRONIDAZOLE 500 MG/1
500 TABLET ORAL 2 TIMES DAILY
Qty: 14 TABLET | Refills: 0 | Status: SHIPPED | OUTPATIENT
Start: 2024-02-01 | End: 2024-02-08

## 2024-02-01 SDOH — HEALTH STABILITY: PHYSICAL HEALTH: ON AVERAGE, HOW MANY DAYS PER WEEK DO YOU ENGAGE IN MODERATE TO STRENUOUS EXERCISE (LIKE A BRISK WALK)?: 3 DAYS

## 2024-02-01 SDOH — HEALTH STABILITY: PHYSICAL HEALTH: ON AVERAGE, HOW MANY MINUTES DO YOU ENGAGE IN EXERCISE AT THIS LEVEL?: 30 MIN

## 2024-02-01 ASSESSMENT — SOCIAL DETERMINANTS OF HEALTH (SDOH): HOW OFTEN DO YOU GET TOGETHER WITH FRIENDS OR RELATIVES?: THREE TIMES A WEEK

## 2024-02-01 NOTE — PROGRESS NOTES
"Preventive Care Visit  [facility]  Gisella Licea MD, Family Medicine  2024    Assessment & Plan     Routine general medical examination at a health care facility    Visit for screening mammogram  Pt will schedule  - MA SCREENING DIGITAL BILAT - Future  (s+30)    Screen for colon cancer  Maternal aunt recently  of colon cancer. Pt will schedule  - Colonoscopy Screening  Referral    Cervical cancer screening  Done today  - Pap Screen with HPV - recommended age 30 - 65 years    Alcohol dependence in remission (H)  No concerns    Screening for cardiovascular condition  - Lipid panel reflex to direct LDL Fasting  - Lipid panel reflex to direct LDL Fasting    Family history of diabetes mellitus  New diagnosis of DM2 - see below  - Hemoglobin A1c  - Hemoglobin A1c    Vaginal discharge  Bacterial vaginosis on wet prep. Treat with metronidazole 500 mg twice daily for 7 days.  - Wet prep - Clinic Collect  - Chlamydia trachomatis/Neisseria gonorrhoeae by PCR - Clinic Collect  - Urine Culture  - Urine Macroscopic with reflex to Microscopic  - metroNIDAZOLE (FLAGYL) 500 MG tablet  Dispense: 14 tablet; Refill: 0    Type 2 diabetes mellitus without complication, without long-term current use of insulin (H)  New diagnosis. FH diabetes. Mom had   - Adult Diabetes Education  Referral  - Albumin Random Urine Quantitative with Creat Ratio  - Adult Eye  Referral    Environmental allergies  - fluticasone (FLONASE) 50 MCG/ACT nasal spray  Dispense: 16 g; Refill: 11    Mixed stress and urge urinary incontinence  Leaks with coughing, laughing, lifting grandkids and when she gets sudden urge to urinate  - Adult Uro/Gyn  Referral    BMI  Estimated body mass index is 31.93 kg/m  as calculated from the following:    Height as of this encounter: 1.63 m (5' 4.17\").    Weight as of this encounter: 84.8 kg (187 lb).   Weight management plan: Discussed healthy diet and " exercise guidelines    Counseling  Appropriate preventive services were discussed with this patient, including applicable screening as appropriate for fall prevention, nutrition, physical activity, Tobacco-use cessation, weight loss and cognition.  Checklist reviewing preventive services available has been given to the patient.  Reviewed patient's diet, addressing concerns and/or questions.   She is at risk for lack of exercise and has been provided with information to increase physical activity for the benefit of her well-being.   The patient was instructed to see the dentist every 6 months.     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    Urine smells like nail polish remover - sweet/bleach like.   Some vaginal discharge.  Strong appetite - worried about diabetes due to FH of diabetes    Chest Pain  Environmental services - active, walking  Shoulder pain  When bending over and breaghin in - has pain at lower sternum  Like a muscle being torn.   Also with a deep breath    Allergies  Cough - itch in throat  More when ER - itchy eyes and throa  Used to take Claritin daily - it helped. She stopped it a month ago and itching came back.   Has asthma when younger.             2/1/2024   General Health   How would you rate your overall physical health? (!) FAIR   Feel stress (tense, anxious, or unable to sleep) To some extent   (!) STRESS CONCERN      2/1/2024   Nutrition   Three or more servings of calcium each day? (!) NO   Diet: Regular (no restrictions)   How many servings of fruit and vegetables per day? (!) 0-1   How many sweetened beverages each day? (!) 2          2/1/2024   Exercise   Days per week of moderate/strenous exercise 3 days   Average minutes spent exercising at this level 30 min         2/1/2024   Social Factors   Frequency of gathering with friends or relatives Three times a week   Worry food won't last until get money to buy more Yes   Food not last or not have enough money for food? Yes   Do you have housing?  Yes   Are you worried about losing your housing? No   Lack of transportation? No   Unable to get utilities (heat,electricity)? No   (!) FOOD SECURITY CONCERN PRESENT      2/1/2024   Dental   Dentist two times every year? (!) NO         2/1/2024   TB Screening   Were you born outside of [place]?  No         Today's PHQ-2 Score:       2/1/2024     1:54 PM   PHQ-2 ( 1999 Pf[employer])   Q1: Little interest or pleasure in doing things 2   Q2: Feeling down, depressed or hopeless 0   PHQ-2 Score 2   Q1: Little interest or pleasure in doing things More than half the days   Q2: Feeling down, depressed or hopeless Not at all   PHQ-2 Score 2           2/1/2024   Substance Use   Alcohol more than 3/day or more than 7/wk Not Applicable   Do you use any other substances recreationally? No     Social History     Tobacco Use    Smoking status: Former     Packs/day: .5     Types: Cigarettes     Quit date: 8/19/2020     Years since quitting: 3.4    Smokeless tobacco: Never   Vaping Use    Vaping Use: Never used   Substance Use Topics    Alcohol use: Not Currently     Comment: Sober since 8/18/20    Drug use: Not Currently     Types: Other     Comment: Sober since 2007 2/1/2024   LAST FHS-7 RESULTS   1st degree relative breast or ovarian cancer No   Any relative bilateral breast cancer No   Any male have breast cancer No   Any woman have breast and ovarian cancer No   Any woman with breast cancer before 50yrs No   2 or more relatives with breast and/or ovarian cancer No   2 or more relatives with breast and/or bowel cancer No        Annual screen by mutual decision with  "patient        2/1/2024   STI Screening   New sexual partner(s) since last STI/HIV test? No     History of abnormal Pap smear: YES - updated in Problem List and Health Maintenance accordingly        Latest Ref Rng & Units 9/29/2022     8:58 AM 5/2/2019     1:22 PM 5/2/2019     1:10 PM   PAP / HPV   PAP  Negative for Intraepithelial Lesion or Malignancy (NILM)      PAP (Historical)   ASC-US     HPV 16 DNA Negative Negative   Negative    HPV 18 DNA Negative Negative   Negative    Other HR HPV Negative Negative   Positive      The 10-year ASCVD risk score (Alayna MCALLISTER, et al., 2019) is: 1.5%    Values used to calculate the score:      Age: 48 years      Sex: Female      Is Non- : No      Diabetic: Yes      Tobacco smoker: No      Systolic Blood Pressure: 125 mmHg      Is BP treated: No      HDL Cholesterol: 48 mg/dL      Total Cholesterol: 169 mg/dL        2/1/2024   Contraception/Family Planning   Questions about contraception or family planning No        Reviewed and updated as needed this visit by Provider      Problems               Review of Systems     Objective    Exam  /80 (BP Location: Left arm, Patient Position: Sitting, Cuff Size: Adult Regular)   Pulse 66   Temp 97.6  F (36.4  C) (Temporal)   Resp 20   Ht 1.63 m (5' 4.17\")   Wt 84.8 kg (187 lb)   LMP 01/26/2024 (Approximate)   SpO2 98%   BMI 31.93 kg/m     Estimated body mass index is 31.93 kg/m  as calculated from the following:    Height as of this encounter: 1.63 m (5' 4.17\").    Weight as of this encounter: 84.8 kg (187 lb).    Physical Exam  GENERAL: alert and no distress  EYES: Eyes grossly normal to inspection, PERRL and conjunctivae and sclerae normal  HENT: ear canals and TM's normal, nose and mouth without ulcers or lesions  NECK: no adenopathy, no asymmetry, masses, or scars  RESP: lungs clear to auscultation - no rales, rhonchi or wheezes  CV: regular rate and rhythm, normal S1 S2, no S3 or S4, no murmur, " click or rub, no peripheral edema  ABDOMEN: soft, nontender, no hepatosplenomegaly, no masses and bowel sounds normal   (female): normal female external genitalia, normal urethral meatus, vaginal mucosa pink, moist, well rugated, and normal cervix/adnexa/uterus without masses or discharge. Long speculum used. Cervix is on left side and os is directed toward the right. Difficult to visualize os, but visualized cervix is otherwise normal in appearance.   MS: no gross musculoskeletal defects noted, no edema  SKIN: no suspicious lesions or rashes  NEURO: Normal strength and tone, mentation intact and speech normal  PSYCH: mentation appears normal, affect normal/bright  LYMPH: no cervical, supraclavicular, axillary, or inguinal adenopathy    Prior to immunization administration, verified patients identity using patient s name and date of birth. Please see Immunization Activity for additional information.     Screening Questionnaire for Adult Immunization    Are you sick today?   No   Do you have allergies to medications, food, a vaccine component or latex?   No   Have you ever had a serious reaction after receiving a vaccination?   No   Do you have a long-term health problem with heart, lung, kidney, or metabolic disease (e.g., diabetes), asthma, a blood disorder, no spleen, complement component deficiency, a cochlear implant, or a spinal fluid leak?  Are you on long-term aspirin therapy?   No   Do you have cancer, leukemia, HIV/AIDS, or any other immune system problem?   No   Do you have a parent, brother, or sister with an immune system problem?   No   In the past 3 months, have you taken medications that affect  your immune system, such as prednisone, other steroids, or anticancer drugs; drugs for the treatment of rheumatoid arthritis, Crohn s disease, or psoriasis; or have you had radiation treatments?   No   Have you had a seizure, or a brain or other nervous system problem?   No   During the past year, have you  received a transfusion of blood or blood    products, or been given immune (gamma) globulin or antiviral drug?   No   For women: Are you pregnant or is there a chance you could become       pregnant during the next month?   No   Have you received any vaccinations in the past 4 weeks?   No     Immunization questionnaire answers were all negative.      Patient instructed to remain in clinic for 15 minutes afterwards, and to report any adverse reactions.     Screening performed by Kellen Camacho CMA on 2/1/2024 at 4:02 PM.                Signed Electronically by: Gisella iLcea MD

## 2024-02-01 NOTE — PATIENT INSTRUCTIONS
Preventive Care Advice   This is general advice given by our system to help you stay healthy. However, your care team may have specific advice just for you. Please talk to your care team about your preventive care needs.  Nutrition  Eat 5 or more servings of fruits and vegetables each day.  Try wheat bread, brown rice and whole grain pasta (instead of white bread, rice, and pasta).  Get enough calcium and vitamin D. Check the label on foods and aim for 100% of the RDA (recommended daily allowance).  Lifestyle  Exercise at least 150 minutes each week  (30 minutes a day, 5 days a week).  Do muscle strengthening activities 2 days a week. These help control your weight and prevent disease.  No smoking.  Wear sunscreen to prevent skin cancer.  Have a dental exam and cleaning every 6 months.  Yearly exams  See your health care team every year to talk about:  Any changes in your health.  Any medicines your care team has prescribed.  Preventive care, family planning, and ways to prevent chronic diseases.  Shots (vaccines)   HPV shots (up to age 26), if you've never had them before.  Hepatitis B shots (up to age 59), if you've never had them before.  COVID-19 shot: Get this shot when it's due.  Flu shot: Get a flu shot every year.  Tetanus shot: Get a tetanus shot every 10 years.  Pneumococcal, hepatitis A, and RSV shots: Ask your care team if you need these based on your risk.  Shingles shot (for age 50 and up)  General health tests  Diabetes screening:  Starting at age 35, Get screened for diabetes at least every 3 years.  If you are younger than age 35, ask your care team if you should be screened for diabetes.  Cholesterol test: At age 39, start having a cholesterol test every 5 years, or more often if advised.  Bone density scan (DEXA): At age 50, ask your care team if you should have this scan for osteoporosis (brittle bones).  Hepatitis C: Get tested at least once in your life.  STIs (sexually transmitted  infections)  Before age 24: Ask your care team if you should be screened for STIs.  After age 24: Get screened for STIs if you're at risk. You are at risk for STIs (including HIV) if:  You are sexually active with more than one person.  You don't use condoms every time.  You or a partner was diagnosed with a sexually transmitted infection.  If you are at risk for HIV, ask about PrEP medicine to prevent HIV.  Get tested for HIV at least once in your life, whether you are at risk for HIV or not.  Cancer screening tests  Cervical cancer screening: If you have a cervix, begin getting regular cervical cancer screening tests starting at age 21.  Breast cancer scan (mammogram): If you've ever had breasts, begin having regular mammograms starting at age 40. This is a scan to check for breast cancer.  Colon cancer screening: It is important to start screening for colon cancer at age 45.  Have a colonoscopy test every 10 years (or more often if you're at risk) Or, ask your provider about stool tests like a FIT test every year or Cologuard test every 3 years.  To learn more about your testing options, visit:   https://[email or web address]/820666.pdf.  For help making a decision, visit:   https://bit.ly/vj53998.  Prostate cancer screening test: If you have a prostate, ask your care team if a prostate cancer screening test (PSA) at age 55 is right for you.  Lung cancer screening: If you are a current or former smoker ages 50 to 80, ask your care team if ongoing lung cancer screenings are right for you.  For informational purposes only. Not to replace the advice of your health care provider. Copyright   2023 [employer]. All rights reserved. Clinically reviewed by the [facility]. [employer] 336761 - REV 01/24.

## 2024-02-02 LAB
C TRACH DNA SPEC QL PROBE+SIG AMP: NEGATIVE
CHOLEST SERPL-MCNC: 214 MG/DL
FASTING STATUS PATIENT QL REPORTED: NO
FASTING STATUS PATIENT QL REPORTED: NO
GLUCOSE SERPL-MCNC: 110 MG/DL (ref 70–99)
HDLC SERPL-MCNC: 39 MG/DL
LDLC SERPL CALC-MCNC: 122 MG/DL
N GONORRHOEA DNA SPEC QL NAA+PROBE: NEGATIVE
NONHDLC SERPL-MCNC: 175 MG/DL
TRIGL SERPL-MCNC: 264 MG/DL

## 2024-02-06 LAB
BKR LAB AP GYN ADEQUACY: ABNORMAL
BKR LAB AP GYN INTERPRETATION: ABNORMAL
BKR LAB AP GYN OTHER FINDINGS: ABNORMAL
BKR LAB AP HPV REFLEX: ABNORMAL
BKR LAB AP PREVIOUS ABNL DX: ABNORMAL
BKR LAB AP PREVIOUS ABNORMAL: ABNORMAL
PATH REPORT.COMMENTS IMP SPEC: ABNORMAL
PATH REPORT.COMMENTS IMP SPEC: ABNORMAL
PATH REPORT.RELEVANT HX SPEC: ABNORMAL

## 2024-02-07 ENCOUNTER — MYC MEDICAL ADVICE (OUTPATIENT)
Dept: FAMILY MEDICINE | Facility: CLINIC | Age: 49
End: 2024-02-07
Payer: COMMERCIAL

## 2024-02-07 ENCOUNTER — TELEPHONE (OUTPATIENT)
Dept: FAMILY MEDICINE | Facility: CLINIC | Age: 49
End: 2024-02-07
Payer: COMMERCIAL

## 2024-02-07 NOTE — TELEPHONE ENCOUNTER
RN made 1st call to pt to relay provider message re: lab result.     No answer. Unable to leave message due to VM full on cell. Tried home number, no answer, unable to leave message due to VM not set up.      Relayed message via [employer].     Will try again.    Mariola ELIAS RN  [facility]     ----- Message from Gisella Licea MD sent at 2/1/2024  6:13 PM CST -----  Please let Mariola know:    You have bacterial vaginosis - an imbalance of your own normal bacteria.   It does NOT cause anything harmful. Just a discharge with an odor.  To treat it,I'll send in an antibiotic, called metronidazole. It helps restore the bacteria balance.  Don't drink when you're taking it because it will make you sick.

## 2024-02-07 NOTE — TELEPHONE ENCOUNTER
RN contacted patient, relayed provider message. She picked up antibiotic yesterday and is currently on day 2. Verbalizes understanding, denies questions.

## 2024-02-08 PROBLEM — D06.9 HIGH GRADE SQUAMOUS INTRAEPITHELIAL LESION (HGSIL), GRADE 3 CIN, ON BIOPSY OF CERVIX: Status: ACTIVE | Noted: 2017-01-19

## 2024-02-09 ENCOUNTER — PATIENT OUTREACH (OUTPATIENT)
Dept: FAMILY MEDICINE | Facility: CLINIC | Age: 49
End: 2024-02-09
Payer: COMMERCIAL

## 2024-02-09 NOTE — TELEPHONE ENCOUNTER
02/1/24 NIL Pap, Endometrial cells present, LMP 01/26/2024 (Approximate) Neg HR HPV Plan cotest in 1 year due 02/1/25 per provider

## 2024-02-14 ENCOUNTER — TELEPHONE (OUTPATIENT)
Dept: EDUCATION SERVICES | Facility: CLINIC | Age: 49
End: 2024-02-14
Payer: COMMERCIAL

## 2024-02-14 NOTE — LETTER
February 20, 2024      Mariola Pardo  [address]  [place] [address]        Dear Mariola,       As a valued [facility] patient, your healthcare needs are our priority.    Your health care team has determined that you are due for an appointment  with Ophthalmology and Urology  .   We encourage you to call or schedule an appointment with your primary care provider to discuss your overdue screening and schedule an appointment.     If you already have had your screening performed at another health care facility, please ask that practice to send your results to [facility] [phone] and we will update your health records. This will ensure you receive the best possible care from our providers.      If you have any questions or need help with scheduling, please call the [facility] at [phone]. You may also contact Ophthalmology directly at [phone] or Urology at [phone].         Yours in health,       Your care team at [facility]

## 2024-02-14 NOTE — LETTER
February 14, 2024  Re: Mariola Pardo  YOB: 1975    Dear Colleague,    Thank you for your referral to the [facility]OLOG[facility]. We have been unable to schedule the referral after several contact attempts.      If you have any questions or concerns, please contact our office at Dept: [phone].        Sincerely,  [facility]OLOG[facility]

## 2024-02-14 NOTE — TELEPHONE ENCOUNTER
Patient was a no show for a Diabetes appointment on 2/13/24.     Patient was contacted to reschedule.     Action taken: left message to call [phone] or use [employer] to reschedule.

## 2024-02-14 NOTE — LETTER
February 14, 2024  Re: Mariola Pardo  YOB: 1975    Dear Colleague,    Thank you for your referral to the [facility] EYE [facility]. We have been unable to schedule the referral after several contact attempts.      If you have any questions or concerns, please contact our office at Dept: [phone].        Sincerely,  [facility]

## 2024-02-16 NOTE — PROGRESS NOTES
Attempt #1. Unable to leave a message. Received a message stating call couldn't be completed at this time and to call back later. Postponing until Monday. If pt calls back please help pt schedule for urology & eye. Thanks!

## 2024-02-19 NOTE — PROGRESS NOTES
Unable to leave a message. Received a message stating call couldn't be completed at this time and to call back later, will try again tomorrow. Eye and urology

## 2024-02-20 NOTE — PROGRESS NOTES
Attempted to LMTCB #3. Unable to lvm due to voice mailbox full. We have made several attempts to contact patient by phone to schedule an appointment. If patient returns call back, please help patient schedule an appointment per message below. Thanks! Unfortunately, our calls have not been returned and we were unable to schedule. At this time, we will no longer make an attempt to schedule this appointment. Completing task.

## 2024-02-23 PROBLEM — E78.5 HYPERLIPIDEMIA LDL GOAL <130: Status: ACTIVE | Noted: 2024-02-23

## 2024-03-09 NOTE — TELEPHONE ENCOUNTER
MEDICAL RECORDS REQUEST   [facility]  Urology Clinic  [address] [place] [address]  PHONE: [phone]  Fax: [phone]        FUTURE VISIT INFORMATION                                                   Mariola Mora Char, : 1975 scheduled for future visit at [facility]    APPOINTMENT INFORMATION:  Date: 2024  Provider:  Claudy Ny MD  Reason for Visit/Diagnosis: Mixed Incontinence    REFERRAL INFORMATION:  Referring provider:  Gisella Licea MD in [facility] FAMILY MEDICINE/OB      RECORDS REQUESTED FOR VISIT                                                     NOTES  STATUS/DETAILS   OFFICE NOTE from referring provider  yes, 2024 -- Gisella Licea MD in [facility] FAMILY MEDICINE/OB   MEDICATION LIST  yes   LABS     URINALYSIS (UA)  yes     PRE-VISIT CHECKLIST      Joint diagnostic appointment coordinated correctly          (ensure right order & amount of time) Yes   RECORD COLLECTION COMPLETE Yes

## 2024-03-12 ENCOUNTER — PRE VISIT (OUTPATIENT)
Dept: UROLOGY | Facility: CLINIC | Age: 49
End: 2024-03-12
Payer: COMMERCIAL

## 2024-03-12 NOTE — TELEPHONE ENCOUNTER
Reason for visit: consult     Relevant information: mixed incontinence    Records/imaging/labs/orders: in epic    Pt called: No need for a call    At Rooming: virtual visit    Neftaly aLwton  3/12/2024  2:58 PM

## 2024-04-19 ENCOUNTER — PRE VISIT (OUTPATIENT)
Dept: UROLOGY | Facility: CLINIC | Age: 49
End: 2024-04-19

## 2024-06-15 ENCOUNTER — HEALTH MAINTENANCE LETTER (OUTPATIENT)
Age: 49
End: 2024-06-15

## 2024-09-05 ENCOUNTER — PATIENT OUTREACH (OUTPATIENT)
Dept: CARE COORDINATION | Facility: CLINIC | Age: 49
End: 2024-09-05
Payer: COMMERCIAL

## 2024-11-02 ENCOUNTER — HEALTH MAINTENANCE LETTER (OUTPATIENT)
Age: 49
End: 2024-11-02

## 2024-12-06 ENCOUNTER — ANCILLARY PROCEDURE (OUTPATIENT)
Dept: GENERAL RADIOLOGY | Facility: CLINIC | Age: 49
End: 2024-12-06
Attending: PHYSICIAN ASSISTANT
Payer: COMMERCIAL

## 2024-12-06 DIAGNOSIS — J22 LRTI (LOWER RESPIRATORY TRACT INFECTION): ICD-10-CM

## 2024-12-06 PROCEDURE — 71046 X-RAY EXAM CHEST 2 VIEWS: CPT | Mod: TC | Performed by: RADIOLOGY

## 2024-12-29 DIAGNOSIS — J22 LRTI (LOWER RESPIRATORY TRACT INFECTION): ICD-10-CM

## 2024-12-30 NOTE — TELEPHONE ENCOUNTER
Clinic RN: Please investigate patient's chart or contact patient if the information cannot be found because this medication was prescribed for an acute condition. Confirm current symptoms/need for medication and possible need for appointment. If necessary, document reason and route refill encounter to provider for approval or denial.    Elida ASHFORD RN  [facility]

## 2025-01-03 NOTE — TELEPHONE ENCOUNTER
Attempt # 1  Called Phone # [phone]      Was Call answered? No    Attempted to call patient, but patient's voicemail is full. Unable to leave message.     Need to confirm patient is still needing inhaler as this was prescribed for acute illness. If patient not feeling better likely needs a follow up appointment.      Thank you,  Carlos Manuel, Triage RN [facility] [place]    11:24 AM 1/3/2025

## 2025-01-08 NOTE — TELEPHONE ENCOUNTER
Attempt #2. No answer. Not able to LVM. Please relayed message below upon return call.      Ryder MEDRANO RN  [facility]

## 2025-01-13 PROBLEM — D06.9 HIGH GRADE SQUAMOUS INTRAEPITHELIAL LESION (HGSIL), GRADE 3 CIN, ON BIOPSY OF CERVIX: Status: ACTIVE | Noted: 2017-01-19

## 2025-01-13 RX ORDER — ALBUTEROL SULFATE 90 UG/1
2 AEROSOL, METERED RESPIRATORY (INHALATION) EVERY 6 HOURS PRN
Qty: 18 G | Refills: 0 | OUTPATIENT
Start: 2025-01-13

## 2025-01-13 NOTE — TELEPHONE ENCOUNTER
3rd attempt: No answer, no VM x2. Sent G.ho.st message to patient. Closing encounter per protocol.    Baylee Luke RN  [facility]

## 2025-03-01 ENCOUNTER — HEALTH MAINTENANCE LETTER (OUTPATIENT)
Age: 50
End: 2025-03-01

## 2025-03-05 ENCOUNTER — PATIENT OUTREACH (OUTPATIENT)
Dept: FAMILY MEDICINE | Facility: CLINIC | Age: 50
End: 2025-03-05
Payer: COMMERCIAL

## 2025-06-15 ENCOUNTER — HEALTH MAINTENANCE LETTER (OUTPATIENT)
Age: 50
End: 2025-06-15